# Patient Record
Sex: MALE | Race: WHITE | NOT HISPANIC OR LATINO | Employment: FULL TIME | URBAN - METROPOLITAN AREA
[De-identification: names, ages, dates, MRNs, and addresses within clinical notes are randomized per-mention and may not be internally consistent; named-entity substitution may affect disease eponyms.]

---

## 2018-04-13 ENCOUNTER — HISTORICAL (OUTPATIENT)
Dept: ADMINISTRATIVE | Facility: HOSPITAL | Age: 58
End: 2018-04-13

## 2018-04-13 LAB
ABS NEUT (OLG): 3.2
ALBUMIN SERPL-MCNC: 4.7 GM/DL (ref 3.4–5)
ALBUMIN/GLOB SERPL: 2.04 {RATIO} (ref 1.5–2.5)
ALP SERPL-CCNC: 26 UNIT/L (ref 38–126)
ALT SERPL-CCNC: 25 UNIT/L (ref 7–52)
AST SERPL-CCNC: 21 UNIT/L (ref 15–37)
BILIRUB SERPL-MCNC: 0.5 MG/DL (ref 0.2–1)
BILIRUBIN DIRECT+TOT PNL SERPL-MCNC: 0.1 MG/DL (ref 0–0.5)
BUN SERPL-MCNC: 15 MG/DL (ref 7–18)
CALCIUM SERPL-MCNC: 9.3 MG/DL (ref 8.5–10)
CHLORIDE SERPL-SCNC: 99 MMOL/L (ref 98–107)
CHOLEST SERPL-MCNC: 181 MG/DL (ref 0–200)
CHOLEST/HDLC SERPL: 3.2 {RATIO}
CO2 SERPL-SCNC: 26 MMOL/L (ref 21–32)
CREAT SERPL-MCNC: 1.08 MG/DL (ref 0.6–1.3)
CREAT/UREA NIT SERPL: 13.9
ERYTHROCYTE [DISTWIDTH] IN BLOOD BY AUTOMATED COUNT: 12.7 % (ref 11.5–17)
GGT SERPL-CCNC: 50 UNIT/L (ref 5–85)
GLOBULIN SER-MCNC: 2.3 GM/DL (ref 1.2–3)
GLUCOSE SERPL-MCNC: 106 MG/DL (ref 74–106)
HCT VFR BLD AUTO: 46.9 % (ref 42–52)
HDLC SERPL-MCNC: 56 MG/DL (ref 35–60)
HGB BLD-MCNC: 16.2 GM/DL (ref 14–18)
LDH SERPL-CCNC: 132 UNIT/L (ref 140–271)
LDLC SERPL CALC-MCNC: 104 MG/DL (ref 0–129)
LYMPHOCYTES # BLD AUTO: 1.8 X10(3)/MCL (ref 0.6–3.4)
LYMPHOCYTES NFR BLD AUTO: 33.5 % (ref 13–40)
MCH RBC QN AUTO: 33.5 PG (ref 27–31.2)
MCHC RBC AUTO-ENTMCNC: 34 GM/DL (ref 32–36)
MCV RBC AUTO: 97 FL (ref 80–94)
MONOCYTES # BLD AUTO: 0.3 X10(3)/MCL (ref 0–1.8)
MONOCYTES NFR BLD AUTO: 5.3 % (ref 0.1–24)
NEUTROPHILS NFR BLD AUTO: 61.2 % (ref 47–80)
PLATELET # BLD AUTO: 172 X10(3)/MCL (ref 130–400)
PMV BLD AUTO: 8.3 FL
POTASSIUM SERPL-SCNC: 4.5 MMOL/L (ref 3.5–5.1)
PROT SERPL-MCNC: 7 GM/DL (ref 6.4–8.2)
RBC # BLD AUTO: 4.84 X10(6)/MCL (ref 4.7–6.1)
SODIUM SERPL-SCNC: 134 MMOL/L (ref 136–145)
TRIGL SERPL-MCNC: 95 MG/DL (ref 30–150)
TSH SERPL-ACNC: 2.38 MIU/ML (ref 0.35–4.94)
VLDLC SERPL CALC-MCNC: 19 MG/DL
WBC # SPEC AUTO: 5.3 X10(3)/MCL (ref 4.5–11.5)

## 2019-04-24 ENCOUNTER — HISTORICAL (OUTPATIENT)
Dept: ADMINISTRATIVE | Facility: HOSPITAL | Age: 59
End: 2019-04-24

## 2019-04-24 LAB
ABS NEUT (OLG): 3.9 X10(3)/MCL (ref 2.1–9.2)
ALBUMIN SERPL-MCNC: 4.1 GM/DL (ref 3.4–5)
ALBUMIN/GLOB SERPL: 1.95 {RATIO} (ref 1.5–2.5)
ALP SERPL-CCNC: 52 UNIT/L (ref 38–126)
ALT SERPL-CCNC: 28 UNIT/L (ref 7–52)
APPEARANCE, UA: CLEAR
AST SERPL-CCNC: 19 UNIT/L (ref 15–37)
BACTERIA #/AREA URNS AUTO: NORMAL /HPF
BILIRUB SERPL-MCNC: 0.4 MG/DL (ref 0.2–1)
BILIRUB UR QL STRIP: NEGATIVE MG/DL
BILIRUBIN DIRECT+TOT PNL SERPL-MCNC: 0.1 MG/DL (ref 0–0.5)
BILIRUBIN DIRECT+TOT PNL SERPL-MCNC: 0.3 MG/DL
BUN SERPL-MCNC: 12 MG/DL (ref 7–18)
CALCIUM SERPL-MCNC: 8.8 MG/DL (ref 8.5–10)
CHLORIDE SERPL-SCNC: 99 MMOL/L (ref 98–107)
CHOLEST SERPL-MCNC: 152 MG/DL (ref 0–200)
CHOLEST/HDLC SERPL: 3.4 {RATIO}
CO2 SERPL-SCNC: 31 MMOL/L (ref 21–32)
COLOR UR: YELLOW
CREAT SERPL-MCNC: 0.92 MG/DL (ref 0.6–1.3)
ERYTHROCYTE [DISTWIDTH] IN BLOOD BY AUTOMATED COUNT: 12.9 % (ref 11.5–17)
GLOBULIN SER-MCNC: 2.1 GM/DL (ref 1.2–3)
GLUCOSE (UA): NEGATIVE MG/DL
GLUCOSE SERPL-MCNC: 124 MG/DL (ref 74–106)
HCT VFR BLD AUTO: 33.3 % (ref 42–52)
HDLC SERPL-MCNC: 45 MG/DL (ref 35–60)
HGB BLD-MCNC: 11.6 GM/DL (ref 14–18)
HGB UR QL STRIP: NEGATIVE UNIT/L
KETONES UR QL STRIP: NEGATIVE MG/DL
LDLC SERPL CALC-MCNC: 79 MG/DL (ref 0–129)
LEUKOCYTE ESTERASE UR QL STRIP: NEGATIVE UNIT/L
LYMPHOCYTES # BLD AUTO: 1.1 X10(3)/MCL (ref 0.6–3.4)
LYMPHOCYTES NFR BLD AUTO: 20.5 % (ref 13–40)
MCH RBC QN AUTO: 31.5 PG (ref 27–31.2)
MCHC RBC AUTO-ENTMCNC: 35 GM/DL (ref 32–36)
MCV RBC AUTO: 90 FL (ref 80–94)
MONOCYTES # BLD AUTO: 0.6 X10(3)/MCL (ref 0.1–1.3)
MONOCYTES NFR BLD AUTO: 10.9 % (ref 0.1–24)
NEUTROPHILS NFR BLD AUTO: 68.6 % (ref 47–80)
NITRITE UR QL STRIP.AUTO: NEGATIVE
PH UR STRIP: 7 [PH]
PLATELET # BLD AUTO: 213 X10(3)/MCL (ref 130–400)
PMV BLD AUTO: 7.8 FL (ref 9.4–12.4)
POTASSIUM SERPL-SCNC: 4.6 MMOL/L (ref 3.5–5.1)
PROT SERPL-MCNC: 6.2 GM/DL (ref 6.4–8.2)
PROT UR QL STRIP: NEGATIVE MG/DL
PSA SERPL-MCNC: 3.61 NG/ML (ref 0–3.5)
RBC # BLD AUTO: 3.68 X10(6)/MCL (ref 4.7–6.1)
RBC #/AREA URNS HPF: NORMAL /HPF
SODIUM SERPL-SCNC: 136 MMOL/L (ref 136–145)
SP GR UR STRIP: 1.02
SQUAMOUS EPITHELIAL, UA: NORMAL /LPF
T4 FREE SERPL-MCNC: 0.89 NG/DL (ref 0.76–1.46)
TESTOST SERPL-MCNC: 248 NG/DL (ref 300–1060)
TRIGL SERPL-MCNC: 128 MG/DL (ref 30–150)
TSH SERPL-ACNC: 1.54 MIU/ML (ref 0.35–4.94)
UROBILINOGEN UR STRIP-ACNC: 0.2 MG/DL
VLDLC SERPL CALC-MCNC: 25.6 MG/DL
WBC # SPEC AUTO: 5.6 X10(3)/MCL (ref 4.5–11.5)
WBC #/AREA URNS AUTO: NORMAL /[HPF]

## 2019-04-25 LAB
EST. AVERAGE GLUCOSE BLD GHB EST-MCNC: 105 MG/DL
HBA1C MFR BLD: 5.3 % (ref 4.4–6.4)

## 2019-04-29 ENCOUNTER — HISTORICAL (OUTPATIENT)
Dept: LAB | Facility: HOSPITAL | Age: 59
End: 2019-04-29

## 2019-04-29 LAB
FERRITIN SERPL-MCNC: 300.8 NG/ML (ref 8–388)
IRON SATN MFR SERPL: 18.7 % (ref 20–50)
IRON SERPL-MCNC: 45 MCG/DL (ref 50–175)
RET# (OHS): 0.09 X10^6/ML (ref 0.03–0.1)
RETICULOCYTE COUNT AUTOMATED (OLG): 2.4 % (ref 1.1–2.1)
TIBC SERPL-MCNC: 241 MCG/DL (ref 250–450)
TRANSFERRIN SERPL-MCNC: 189 MG/DL (ref 200–360)

## 2019-10-25 ENCOUNTER — HISTORICAL (OUTPATIENT)
Dept: ADMINISTRATIVE | Facility: HOSPITAL | Age: 59
End: 2019-10-25

## 2019-10-25 LAB
ABS NEUT (OLG): 2.43 X10(3)/MCL (ref 2.1–9.2)
BASOPHILS # BLD AUTO: 0 X10(3)/MCL (ref 0–0.2)
BASOPHILS NFR BLD AUTO: 1 %
EOSINOPHIL # BLD AUTO: 0.2 X10(3)/MCL (ref 0–0.9)
EOSINOPHIL NFR BLD AUTO: 4 %
ERYTHROCYTE [DISTWIDTH] IN BLOOD BY AUTOMATED COUNT: 12.7 % (ref 11.5–17)
FERRITIN SERPL-MCNC: 141 NG/ML (ref 8–388)
FOLATE SERPL-MCNC: 24.1 NG/ML (ref 3.1–17.5)
HAPTOGLOB SERPL-MCNC: 113 MG/DL (ref 31–200)
HCT VFR BLD AUTO: 40.5 % (ref 42–52)
HGB BLD-MCNC: 13.6 GM/DL (ref 14–18)
IRON SATN MFR SERPL: 22.3 % (ref 20–50)
IRON SERPL-MCNC: 68 MCG/DL (ref 50–175)
LDH SERPL-CCNC: 142 UNIT/L (ref 87–241)
LYMPHOCYTES # BLD AUTO: 1.4 X10(3)/MCL (ref 0.6–4.6)
LYMPHOCYTES NFR BLD AUTO: 31 %
MCH RBC QN AUTO: 32.3 PG (ref 27–31)
MCHC RBC AUTO-ENTMCNC: 33.6 GM/DL (ref 33–36)
MCV RBC AUTO: 96.2 FL (ref 80–94)
MONOCYTES # BLD AUTO: 0.4 X10(3)/MCL (ref 0.1–1.3)
MONOCYTES NFR BLD AUTO: 9 %
NEUTROPHILS # BLD AUTO: 2.43 X10(3)/MCL (ref 2.1–9.2)
NEUTROPHILS NFR BLD AUTO: 55 %
PLATELET # BLD AUTO: 161 X10(3)/MCL (ref 130–400)
PMV BLD AUTO: 9 FL (ref 9.4–12.4)
RBC # BLD AUTO: 4.21 X10(6)/MCL (ref 4.7–6.1)
RET# (OHS): 0.08 X10^6/ML (ref 0.03–0.1)
RETICULOCYTE COUNT AUTOMATED (OLG): 1.8 % (ref 1.1–2.1)
TIBC SERPL-MCNC: 305 MCG/DL (ref 250–450)
TRANSFERRIN SERPL-MCNC: 233 MG/DL (ref 200–360)
VIT B12 SERPL-MCNC: 491 PG/ML (ref 193–986)
WBC # SPEC AUTO: 4.4 X10(3)/MCL (ref 4.5–11.5)

## 2020-04-30 ENCOUNTER — HISTORICAL (OUTPATIENT)
Dept: LAB | Facility: HOSPITAL | Age: 60
End: 2020-04-30

## 2020-04-30 ENCOUNTER — HISTORICAL (OUTPATIENT)
Dept: ADMINISTRATIVE | Facility: HOSPITAL | Age: 60
End: 2020-04-30

## 2020-04-30 LAB
ABS NEUT (OLG): 2.8 X10(3)/MCL (ref 2.1–9.2)
ALBUMIN SERPL-MCNC: 4.4 GM/DL (ref 3.4–5)
ALBUMIN/GLOB SERPL: 1.8 RATIO (ref 1.1–2)
ALP SERPL-CCNC: 45 UNIT/L (ref 40–150)
ALT SERPL-CCNC: 15 UNIT/L (ref 0–55)
APPEARANCE, UA: CLEAR
AST SERPL-CCNC: 17 UNIT/L (ref 5–34)
BACTERIA #/AREA URNS AUTO: NORMAL /HPF
BILIRUB SERPL-MCNC: 0.5 MG/DL
BILIRUB UR QL STRIP: NEGATIVE MG/DL
BILIRUBIN DIRECT+TOT PNL SERPL-MCNC: 0.2 MG/DL (ref 0–0.5)
BILIRUBIN DIRECT+TOT PNL SERPL-MCNC: 0.3 MG/DL (ref 0–0.8)
BUN SERPL-MCNC: 18 MG/DL (ref 7–25)
CALCIUM SERPL-MCNC: 9.3 MG/DL (ref 8.5–10.1)
CHLORIDE SERPL-SCNC: 100 MMOL/L (ref 98–107)
CHOLEST SERPL-MCNC: 220 MG/DL
CHOLEST/HDLC SERPL: 4 {RATIO} (ref 0–5)
CO2 SERPL-SCNC: 28 MMOL/L (ref 23–31)
COLOR UR: YELLOW
CREAT SERPL-MCNC: 1.05 MG/DL (ref 0.73–1.18)
ERYTHROCYTE [DISTWIDTH] IN BLOOD BY AUTOMATED COUNT: 12.6 % (ref 11.5–17)
GLOBULIN SER-MCNC: 2.5 GM/DL (ref 2.4–3.5)
GLUCOSE (UA): NEGATIVE MG/DL
GLUCOSE SERPL-MCNC: 108 MG/DL (ref 82–115)
HCT VFR BLD AUTO: 41.5 % (ref 42–52)
HDLC SERPL-MCNC: 49 MG/DL (ref 35–60)
HGB BLD-MCNC: 13.9 GM/DL (ref 14–18)
HGB UR QL STRIP: NEGATIVE UNIT/L
KETONES UR QL STRIP: NEGATIVE MG/DL
LDLC SERPL CALC-MCNC: 141 MG/DL (ref 50–140)
LEUKOCYTE ESTERASE UR QL STRIP: NEGATIVE UNIT/L
LYMPHOCYTES # BLD AUTO: 1.3 X10(3)/MCL (ref 0.6–3.4)
LYMPHOCYTES NFR BLD AUTO: 29 % (ref 13–40)
MCH RBC QN AUTO: 31.2 PG (ref 27–31)
MCHC RBC AUTO-ENTMCNC: 34 GM/DL (ref 33–36)
MCV RBC AUTO: 93 FL (ref 80–94)
MONOCYTES # BLD AUTO: 0.4 X10(3)/MCL (ref 0.1–1.3)
MONOCYTES NFR BLD AUTO: 8.4 % (ref 0.1–24)
NEUTROPHILS NFR BLD AUTO: 62.6 % (ref 47–80)
NITRITE UR QL STRIP.AUTO: NEGATIVE
PH UR STRIP: 6.5 [PH]
PLATELET # BLD AUTO: 198 X10(3)/MCL (ref 130–400)
PMV BLD AUTO: 8.5 FL (ref 9.4–12.4)
POTASSIUM SERPL-SCNC: 4.5 MMOL/L (ref 3.5–5.1)
PROT SERPL-MCNC: 6.9 GM/DL (ref 5.8–7.6)
PROT UR QL STRIP: NEGATIVE MG/DL
PSA SERPL-MCNC: 3.34 NG/ML (ref 0–4.5)
RBC # BLD AUTO: 4.46 X10(6)/MCL (ref 4.7–6.1)
RBC #/AREA URNS HPF: NORMAL /[HPF]
SODIUM SERPL-SCNC: 135 MMOL/L (ref 136–145)
SP GR UR STRIP: 1.02
SQUAMOUS EPITHELIAL, UA: NORMAL /LPF
T4 FREE SERPL-MCNC: 0.87 NG/DL (ref 0.76–1.46)
TRIGL SERPL-MCNC: 149 MG/DL (ref 34–140)
TSH SERPL-ACNC: 3.05 MIU/ML (ref 0.35–4.94)
UROBILINOGEN UR STRIP-ACNC: 0.2 MG/DL
VLDLC SERPL CALC-MCNC: 30 MG/DL
WBC # SPEC AUTO: 4.5 X10(3)/MCL (ref 4.5–11.5)
WBC #/AREA URNS AUTO: NORMAL /[HPF]

## 2022-04-10 ENCOUNTER — HISTORICAL (OUTPATIENT)
Dept: ADMINISTRATIVE | Facility: HOSPITAL | Age: 62
End: 2022-04-10

## 2022-04-29 VITALS
WEIGHT: 195.56 LBS | BODY MASS INDEX: 26.49 KG/M2 | DIASTOLIC BLOOD PRESSURE: 70 MMHG | SYSTOLIC BLOOD PRESSURE: 110 MMHG | HEIGHT: 72 IN

## 2022-05-02 NOTE — HISTORICAL [facility]ERNER
This is a historical note converted from Wen. Formatting and pictures may have been removed.  Please reference Wen for original formatting and attached multimedia. Chief Complaint  ANNUAL WELLNESS PHYSICAL- NPO  History of Present Illness  Pt presents for Wellness exam.  He has been feeling pretty well.  He sleeps well.  His appetite is good.  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  He lives with his spouse and 2 children. He also has a stepdaughter.  He has alcohol 1-2 times a week.  He has 2 cups of coffee a day.  Tobacco:? He may dip once a week.  He exercises?1-2?times a week.  Colonoscopy 2014.  Review of Systems  Constitutional:?no?weight gain,?no?weight loss,?+?fatigue, ?no?fever, ?no?chills, ?no?weakness, ?no?trouble sleeping  Eyes: ?no?vision loss/changes,?+?glasses?and contacts,??no?pain,??no?redness,??no?blurry or double vision,??no?flashing lights,??no?glaucoma,??no?cataracts  Last eye exam:?about 1 year  Neck: ?no?lymphadenopathy,??no?thyroid abnormalities,??no?bruits,??no?stiffness  Ears:?no?decreased hearing,??+?tinnitus,??no?earache,??no?drainage?  Nose:?no?congestion,??no?rhinorrhea,??no?epistaxis,??no?sinus pressure  Throat/Oral:?no?sore throat,??no?hoarseness, ?no?dental caries,??no?gum bleeding,??no?oral lesions  Cardiovascular:?no?chest pain, palpitations, or tightness,?+?dyspnea with exertion,??no?orthopnea,??no?paroxysmal nocturnal dyspnea; he had some chest tightness last Friday while he was in [place]  Respiratory:?no?cough,?no?sputum,??no?hemoptysis,??no?dyspnea,??no?wheezing,??no?pleuritic chest pain?  Gastrointestinal:?no?abdominal pain,?no?nausea,?no?vomiting,??no?heartburn,??no?dysphagia or odynophagia,??no?diarrhea,??no?constipation,??no?melena,??+?hematochezia occasionally when he wipes,?no?jaundice  Urinary:?no?frequency,??no?urgency,??no?burning or pain,?no?hematuria,??no?incontinence,??no?hesitancy,??no?incomplete voiding,??no?flank  pain,??no?nocturia  Musculoskeletal:?no?myalgias,?+?arthralgias neck , he has some tightness behind his right shoulder, +?neck pain,??no?back pain,??no?swelling of extremities  Skin:?no?rashes,?no?sores,?no?non-healing wounds  Neurologic:?+?headaches over the last few months: dull, persistent, ?no?dizziness/lightheadedness,?no?tremors,?no?paresthesias,??no?seizures,??no?muscle weakness  Psychiatric:?no?depression/sadness,?no?anhedonia,?no?irritability,?no?suicidal ideations,?no?anxiety,?no?panic attacks  Endocrine:?no?hot or cold intolerance,?no?sweating,?no?polyuria,?no?polydipsia,?no?polyphagia  Hematologic:?no?bruising,??no?bleeding disorders?  Physical Exam  Vitals & Measurements  HR:?84(Peripheral)? BP:?118/60?  HT:?182?cm? WT:?91.0?kg? BMI:?27.47?  ?  GENERAL: The patient is a well-developed, well-nourished male in no?apparent distress. He is alert and oriented x 4.  HEENT: Head is normocephalic and atraumatic. Extraocular muscles are intact. Pupils are equal, round, and reactive to light and accommodation. Nares appeared normal. Mouth is well hydrated and without lesions. Mucous membranes are moist. Posterior pharynx clear of any exudate or lesions.  NECK: Supple. No carotid bruits.? No lymphadenopathy or thyromegaly.  LUNGS: Clear to auscultation.  HEART: Regular rate and rhythm without murmurs, rubs or gallops.  ABDOMEN: Soft, nontender, and nondistended.? Positive bowel sounds.? No hepatosplenomegaly was noted.  EXTREMITIES: Without any cyanosis, clubbing, rash, lesions or edema.  NEUROLOGIC: Cranial nerves II through XII are grossly intact.? No motor or sensory deficits.? Cerebellar function intact.  SKIN: No ulceration or induration present.  :? Normal male genitalia, no hernias,??NST,??prostate soft, smooth and without nodules.  ?  Assessment/Plan  1.?Wellness examination?Z00.00  ?Patient has been doing?well overall.? He does mention of a few issues. ?He does report some fatigue since discontinuing  his testosterone injections.? He still has some neck discomfort even with taking the meloxicam.  He has gotten the first dose of the Shingrix vaccine;?due for the second dose in May.  Ordered:  Automated Diff, Routine collect, 04/24/19 8:26:00 CDT, Blood, Collected, Stop date 04/24/19 8:26:00 CDT, Lab Collect, Wellness examination  HTN (hypertension)  Fatigue, 04/24/19 8:26:00 CDT  CBC w/ Auto Diff, Routine collect, 04/24/19 8:26:00 CDT, Blood, Stop date 04/24/19 8:26:00 CDT, Lab Collect, Wellness examination  HTN (hypertension)  Fatigue, 04/24/19 8:26:00 CDT  Clinic Follow Up Physical, *Est. 04/30/20 8:00:00 CDT, Order for future visit, Wellness examination, [facility]  Comprehensive Metabolic Panel, Routine collect, 04/24/19 8:26:00 CDT, Blood, Stop date 04/24/19 8:26:00 CDT, Lab Collect, Wellness examination  HTN (hypertension)  HLD (hyperlipidemia)  Fatigue, 04/24/19 8:26:00 CDT  Lab Collection Request, 04/24/19 7:56:00 CDT, [facility] AMB - AFP, 04/24/19 7:56:00 CDT  Lipid Panel, Routine collect, 04/24/19 8:26:00 CDT, Blood, Stop date 04/24/19 8:26:00 CDT, Lab Collect, Wellness examination  HLD (hyperlipidemia)  HTN (hypertension), 04/24/19 8:26:00 CDT  Preventative Health Care Est 40-64 years [id number] PC, Wellness examination  HTN (hypertension)  HLD (hyperlipidemia)  Obstructive sleep apnea  Insomnia  Depression  Degenerative disc disease  Hypogonadism  Erectile dysfunction  Fatigue, [facility] AMB - AFP, 04/24/19 7:56:00 CDT  Prostate Specific Antigen, Routine collect, 04/24/19 8:26:00 CDT, Blood, Stop date 04/24/19 8:26:00 CDT, Lab Collect, Wellness examination, 04/24/19 8:26:00 CDT  Urinalysis Complete no reflex, Routine collect, Urine, 04/24/19 8:26:00 CDT, Stop date 04/24/19 8:26:00 CDT, Nurse collect, Wellness examination  HTN (hypertension)  Fatigue  ?  2.?HTN (hypertension)?I10  ?Well-controlled; continue current medication.? Patient?has tried on?olmesartan and valsartan; he prefers?to  valsartan.  Ordered:  Automated Diff, Routine collect, 04/24/19 8:26:00 CDT, Blood, Collected, Stop date 04/24/19 8:26:00 CDT, Lab Collect, Wellness examination  HTN (hypertension)  Fatigue, 04/24/19 8:26:00 CDT  CBC w/ Auto Diff, Routine collect, 04/24/19 8:26:00 CDT, Blood, Stop date 04/24/19 8:26:00 CDT, Lab Collect, Wellness examination  HTN (hypertension)  Fatigue, 04/24/19 8:26:00 CDT  Comprehensive Metabolic Panel, Routine collect, 04/24/19 8:26:00 CDT, Blood, Stop date 04/24/19 8:26:00 CDT, Lab Collect, Wellness examination  HTN (hypertension)  HLD (hyperlipidemia)  Fatigue, 04/24/19 8:26:00 CDT  Lab Collection Request, 04/24/19 7:56:00 CDT, [facility] AMB - AFP, 04/24/19 7:56:00 CDT  Lipid Panel, Routine collect, 04/24/19 8:26:00 CDT, Blood, Stop date 04/24/19 8:26:00 CDT, Lab Collect, Wellness examination  HLD (hyperlipidemia)  HTN (hypertension), 04/24/19 8:26:00 CDT  Preventative Health Care Est 40-64 years [id number] PC, Wellness examination  HTN (hypertension)  HLD (hyperlipidemia)  Obstructive sleep apnea  Insomnia  Depression  Degenerative disc disease  Hypogonadism  Erectile dysfunction  Fatigue, [facility] AMB - AFP, 04/24/19 7:56:00 CDT  Urinalysis Complete no reflex, Routine collect, Urine, 04/24/19 8:26:00 CDT, Stop date 04/24/19 8:26:00 CDT, Nurse collect, Wellness examination  HTN (hypertension)  Fatigue  ?  3.?HLD (hyperlipidemia)?E78.5  ?Check today.  Ordered:  Comprehensive Metabolic Panel, Routine collect, 04/24/19 8:26:00 CDT, Blood, Stop date 04/24/19 8:26:00 CDT, Lab Collect, Wellness examination  HTN (hypertension)  HLD (hyperlipidemia)  Fatigue, 04/24/19 8:26:00 CDT  Lab Collection Request, 04/24/19 7:56:00 CDT, [facility] AMB - AFP, 04/24/19 7:56:00 CDT  Lipid Panel, Routine collect, 04/24/19 8:26:00 CDT, Blood, Stop date 04/24/19 8:26:00 CDT, Lab Collect, Wellness examination  HLD (hyperlipidemia)  HTN (hypertension), 04/24/19 8:26:00 CDT  Preventative Health Care Est 40-64  years [id number] PC, Wellness examination  HTN (hypertension)  HLD (hyperlipidemia)  Obstructive sleep apnea  Insomnia  Depression  Degenerative disc disease  Hypogonadism  Erectile dysfunction  Fatigue, [facility] AMB - AFP, 04/24/19 7:56:00 CDT  ?  4.?Obstructive sleep apnea?G47.33  He has been?compliant with and doing well on therapy.  Ordered:  [facility] Care Est 40-64 years [id number] PC, Wellness examination  HTN (hypertension)  HLD (hyperlipidemia)  Obstructive sleep apnea  Insomnia  Depression  Degenerative disc disease  Hypogonadism  Erectile dysfunction  Fatigue, [facility] AMB - AFP, 04/24/19 7:56:00 CDT  ?  5.?Insomnia?G47.00  Stable on trazodone.  Ordered:  [facility] Care Est 40-64 years [id number] PC, Wellness examination  HTN (hypertension)  HLD (hyperlipidemia)  Obstructive sleep apnea  Insomnia  Depression  Degenerative disc disease  Hypogonadism  Erectile dysfunction  Fatigue, [facility] AMB - AFP, 04/24/19 7:56:00 CDT  ?  6.?Depression?F32.9  ?Stable on Wellbutrin.  Ordered:  [facility] Care Est 40-64 years [id number] PC, Wellness examination  HTN (hypertension)  HLD (hyperlipidemia)  Obstructive sleep apnea  Insomnia  Depression  Degenerative disc disease  Hypogonadism  Erectile dysfunction  Fatigue, [facility] AMB - AFP, 04/24/19 7:56:00 CDT  ?  7.?Degenerative disc disease?M51.9  ?He is still symptomatic; we will increase his meloxicam to 15 mg daily. ?He is to call us in 1 month with an update.  Ordered:  [facility] Care Est 40-64 years [id number] PC, Wellness examination  HTN (hypertension)  HLD (hyperlipidemia)  Obstructive sleep apnea  Insomnia  Depression  Degenerative disc disease  Hypogonadism  Erectile dysfunction  Fatigue, [facility] AMB - AFP, 04/24/19 7:56:00 CDT  ?  8.?Hypogonadism?E23.7  We will check his levels?as he has had decreased vitality energy since discontinuing his injections.  Ordered:  Lab Collection Request, 04/24/19 7:56:00 CDT, [facility]  AMB - AFP, 04/24/19 7:56:00 CDT  Preventative Health Care Est 40-64 years [id number] PC, Wellness examination  HTN (hypertension)  HLD (hyperlipidemia)  Obstructive sleep apnea  Insomnia  Depression  Degenerative disc disease  Hypogonadism  Erectile dysfunction  Fatigue, [facility] AMB - AFP, 04/24/19 7:56:00 CDT  Testosterone Level Total, Routine collect, 04/24/19 8:26:00 CDT, Blood, Stop date 04/24/19 8:26:00 CDT, Lab Collect, Hypogonadism  Erectile dysfunction  Fatigue, 04/24/19 8:26:00 CDT  ?  9.?Erectile dysfunction?N52.9  We gave him a?prescription refill for generic tadalafil?20 mg.  Ordered:  Preventative Health Care Est 40-64 years [id number] PC, Wellness examination  HTN (hypertension)  HLD (hyperlipidemia)  Obstructive sleep apnea  Insomnia  Depression  Degenerative disc disease  Hypogonadism  Erectile dysfunction  Fatigue, [facility] AMB - AFP, 04/24/19 7:56:00 CDT  Testosterone Level Total, Routine collect, 04/24/19 8:26:00 CDT, Blood, Stop date 04/24/19 8:26:00 CDT, Lab Collect, Hypogonadism  Erectile dysfunction  Fatigue, 04/24/19 8:26:00 CDT  ?  10.?Fatigue?R53.83  ?We will check patients lab, including his testosterone level.  He?was advised to be more physically active and to increase his exercise.  Ordered:  Automated Diff, Routine collect, 04/24/19 8:26:00 CDT, Blood, Collected, Stop date 04/24/19 8:26:00 CDT, Lab Collect, Wellness examination  HTN (hypertension)  Fatigue, 04/24/19 8:26:00 CDT  CBC w/ Auto Diff, Routine collect, 04/24/19 8:26:00 CDT, Blood, Stop date 04/24/19 8:26:00 CDT, Lab Collect, Wellness examination  HTN (hypertension)  Fatigue, 04/24/19 8:26:00 CDT  Comprehensive Metabolic Panel, Routine collect, 04/24/19 8:26:00 CDT, Blood, Stop date 04/24/19 8:26:00 CDT, Lab Collect, Wellness examination  HTN (hypertension)  HLD (hyperlipidemia)  Fatigue, 04/24/19 8:26:00 CDT  Free T4, Routine collect, 04/24/19 8:26:00 CDT, Blood, Stop date 04/24/19 8:26:00 CDT, Lab Collect,  Fatigue, 04/24/19 8:26:00 CDT  Lab Collection Request, 04/24/19 7:56:00 CDT, [facility] AMB - AFP, 04/24/19 7:56:00 CDT  [facility] Care Est 40-64 years [id number] PC, Wellness examination  HTN (hypertension)  HLD (hyperlipidemia)  Obstructive sleep apnea  Insomnia  Depression  Degenerative disc disease  Hypogonadism  Erectile dysfunction  Fatigue, [facility] AMB - AFP, 04/24/19 7:56:00 CDT  Testosterone Level Total, Routine collect, 04/24/19 8:26:00 CDT, Blood, Stop date 04/24/19 8:26:00 CDT, Lab Collect, Hypogonadism  Erectile dysfunction  Fatigue, 04/24/19 8:26:00 CDT  Thyroid Stimulating Hormone, Routine collect, 04/24/19 8:26:00 CDT, Blood, Stop date 04/24/19 8:26:00 CDT, Lab Collect, Fatigue, 04/24/19 8:26:00 CDT  Urinalysis Complete no reflex, Routine collect, Urine, 04/24/19 8:26:00 CDT, Stop date 04/24/19 8:26:00 CDT, Nurse collect, Wellness examination  HTN (hypertension)  Fatigue  ?  Orders:  atorvastatin, See Instructions, TAKE 1 TABLET DAILY, # 90 tab(s), 3 Refill(s), Pharmacy: [id number]  buPROPion, 300 mg = 1 tab(s), Oral, Daily, # 90 tab(s), 3 Refill(s), Pharmacy: [facility] [id number]  meloxicam, 15 mg = 1 tab(s), Oral, Daily, # 90 tab(s), 2 Refill(s), Pharmacy: [facility] [id number]  tadalafil, 20 mg = 1 tab(s), Oral, Daily, # 5 tab(s), 5 Refill(s), Pharmacy: [facility] [id number]  traZODone, See Instructions, TAKE 1 TABLET BY MOUTH EVERY NIGHT AT BEDTIME, # 90 tab(s), 3 Refill(s), Pharmacy: [facility] [id number]  valsartan, 80 mg = 1 tab(s), Oral, Daily, # 90 tab(s), 3 Refill(s), Pharmacy: [facility] [id number]   Problem List/Past Medical History  Ongoing  Degenerative disc disease  Depression  Erectile dysfunction  Fatigue  HLD (hyperlipidemia)  HTN (hypertension)  Hypogonadism  Insomnia  Obstructive sleep apnea  Historical  Acute sinusitis  Hypertension  Procedure/Surgical History  Colonoscopy (2014)   Medications  atorvastatin 20 mg oral tablet,  See Instructions, 3 refills  buPROPion 300 mg/24 hours (XL) oral tablet, extended release, 300 mg= 1 tab(s), Oral, Daily, 3 refills  Flexeril 5 mg oral tablet, 5 mg= 1 tab(s), Oral, Once a day (at bedtime), 1 refills  meloxicam 15 mg oral tablet, 15 mg= 1 tab(s), Oral, Daily, 2 refills  tadalafil 20 mg oral tablet, 20 mg= 1 tab(s), Oral, Daily, 5 refills  traZODone 100 mg oral tablet, See Instructions, 3 refills  valsartan 80 mg oral tablet, 80 mg= 1 tab(s), Oral, Daily, 3 refills  Allergies  No Known Allergies  Social History  Alcohol  Current, 1-2 times per week, 04/13/2018  Employment/School  Employed, Work/School description:  FOR [employer] 04/24/2019  Exercise  Exercise duration: 30. Exercise frequency: 1-2 times/week. Exercise type: Aerobics, Running, STRENGTH TRAINING., 04/24/2019  Home/Environment  Lives with Children, Spouse. Living situation: Home/Independent. Home equipment: CPAP/BiPAP., 04/24/2019  Nutrition/Health  Regular, Caffeine intake amount: 2 CUPS OF COFFEE PER DAY., 04/13/2018  Substance Abuse  Never, 04/13/2018  Tobacco  Former smoker, quit more than 30 days ago, N/A, 04/24/2019  Former smoker, Cigarettes, Oral, 1 per day., 04/13/2018  Family History  Alcoholism: Father.  Cancer: Mother.  Cirrhosis of liver: Father.  Diabetes mellitus: Father.  Hyperlipidemia 27-APR-2016 23:35:49<$>: Mother.  Hypertension: Father.  Prostate cancer: Father.  Thyroid disease: Mother.  Immunizations  Vaccine Date Status   influenza virus vaccine, inactivated 09/02/2016 Recorded   pneumococcal 13-valent conjugate vaccine 03/27/2015 Recorded   tetanus/diphtheria/pertussis, acel(Tdap) 03/27/2015 Recorded   Health Maintenance  Health Maintenance  ???Pending?(in the next year)  ??? ??OverDue  ??? ? ? ?Diabetes Screening due??and every?  ??? ? ? ?Hypertension Management-BMP due??04/13/19??and every 1??year(s)  ??? ??Due?  ??? ? ? ?ADL Screening due??04/24/19??and every 1??year(s)  ??? ?  ? ?Alcohol Misuse Screening due??04/24/19??and every 1??year(s)  ??? ? ? ?Aspirin Therapy for CVD Prevention due??04/24/19??and every 1??year(s)  ??? ??Due In Future?  ??? ? ? ?Blood Pressure Screening not due until??04/23/20??and every 1??year(s)  ??? ? ? ?Body Mass Index Check not due until??04/23/20??and every 1??year(s)  ??? ? ? ?Hypertension Management-Blood Pressure not due until??04/23/20??and every 1??year(s)  ???Satisfied?(in the past 1 year)  ??? ??Satisfied?  ??? ? ? ?Blood Pressure Screening on??04/24/19.??Satisfied by Talisha Portillo LPN  ??? ? ? ?Body Mass Index Check on??04/24/19.??Satisfied by Talisha Portillo LPN  ??? ? ? ?Hypertension Management-Blood Pressure on??04/24/19.??Satisfied by Talisha Portillo LPN  ??? ? ? ?Obesity Screening on??04/24/19.??Satisfied by Talisha Portillo LPN  ?  ?  Diagnostic Results  EKG: Normal sinus rhythm, within normal limits.

## 2022-05-02 NOTE — HISTORICAL [facility] CERNER
This is a historical note converted from Wen. Formatting and pictures may have been removed.  Please reference Wen for original formatting and attached multimedia. Chief Complaint  ANNUAL WELLNESS PHYSICAL- NPO  History of Present Illness  See ROS.  Review of Systems  Constitutional:?no?weight gain,?no?weight loss,?+?fatigue, ?no?fever, ?no?chills, ?no?weakness, ?no?trouble sleeping  Eyes: ?no?vision loss/changes,?+?glasses?and contacts,??no?pain,??no?redness,??no?blurry or double vision,??no?flashing lights,??no?glaucoma,??no?cataracts  Last eye exam:??checked last August/September  Neck: ?no?lymphadenopathy,??no?thyroid abnormalities,??no?bruits,??no?stiffness  Ears:?+?decreased hearing,??no?tinnitus,??no?earache,??no?drainage?  Nose:?no?congestion,??no?rhinorrhea,??no?epistaxis,??no?sinus pressure  Throat/Oral:?no?sore throat,??no?hoarseness, ?no?dental caries,??no?gum bleeding,??no?oral lesions  Cardiovascular:?no?chest pain, palpations, or tightness,?no?dyspnea with exertion,??no?orthopnea,??no?paroxysmal nocturnal dyspnea  Respiratory:?no?cough,?no?sputum,??no?hemoptysis,??no?dyspnea,??no?wheezing,??no?pleuritic chest pain?  Gastrointestinal:?no?abd pain,?no?nausea,?no?vomiting,??no?heartburn,??no?dysphagia or odynophagia,??no?diarrhea,??no?constipation,??no?melena,??+?hematochezia when he wipes, he has hemorrhoids,?no?jaundice  Urinary:?no?frequency,??no?urgency,??no?burning or pain,?no?hematuria,??no?incontinence,??no?hesitancy,??no?incomplete voiding,??no?flank pain,??no?nocturia; sees urologist in [place]  Musculoskeletal:?no?myalgias,?no?arthralgias,?+?neck pain; has burning discomfort?without radiation to arms, takes otc analgesics 3-4 x wk,??no?back pain,??no?swelling of extremities  Skin:?no?rashes,?+?sores left forearm; has been cryod but did not go away,?no?non-healing wounds  Neurologic:?no?headaches,?no?dizziness/lightheadedness,?no?tremors,?no?paresthesias,??no?seizures,??no?muscle  weakness  Psychiatric:?no?depression/sadness,?no?anhedonia,?no?irritability,?no?suicidal ideations,?no?anxiety,?no?panic attacks  Endocrine:?no?hot or cold intolerance,?no?sweating,?no?polyuria,?no?polydipsia,?no?polyphagia  Hematologic:?no?bruising,??no?bleeding disorders?  ?  [employer]; computer work  Has a few drinks 1-2x wk  + Exercise 3-4 x wk  Wife reports he snores and quits breathing in his sleep. Wakes up tired at times. Reports daytime sleepiness/hypersomnolence. No headache in am.  Appetite too good  Physical Exam  Vitals & Measurements  HR:?84(Peripheral)? BP:?100/70?  HT:?183?cm? WT:?88.18?kg?  ?  GENERAL: The patient is a well-developed, well-nourished male in no?apparent distress. He is alert and oriented x 4.  HEENT: Head is normocephalic and atraumatic. Extraocular muscles are intact. Pupils are equal, round, and reactive to light and accommodation. Nares appeared normal. Mouth is well hydrated and without lesions. Mucous membranes are moist. Posterior pharynx clear of any exudate or lesions. Mallampati grade II.  NECK: Supple. No carotid bruits.? No lymphadenopathy or thyromegaly.  LUNGS: Clear to auscultation.  HEART: Regular rate and rhythm without murmur.  ABDOMEN: Soft, nontender, and nondistended.? Positive bowel sounds.? No hepatosplenomegaly was noted.  EXTREMITIES: Without any cyanosis, clubbing, rash, lesions or edema.  NEUROLOGIC: Cranial nerves II through XII are grossly intact.? No motor or sensory deficits.? Cerebellar function intact.  SKIN: No ulceration or induration present.? Left forearm at elbow is a dry keratinized slightly raised lesion, appears to be inflamed.  ?  ?  Assessment/Plan  1.?Wellness examination  Ordered:  Automated Diff, Routine collect, 04/13/18 10:37:00 CDT, Blood, Collected, Stop date 04/13/18 10:37:00 CDT, Lab Collect, Wellness examination, 04/13/18 10:37:00 CDT  CBC w/ Auto Diff, Routine collect, 04/13/18 10:37:00 CDT, Blood, Stop date  04/13/18 10:37:00 CDT, Lab Collect, Wellness examination, 04/13/18 10:37:00 CDT  Clinic Follow up, *Est. 04/13/19 3:00:00 CDT, Order for future visit, Wellness examination, [facility] AFP  CMP, Routine collect, 04/13/18 10:37:00 CDT, Blood, Stop date 04/13/18 10:37:00 CDT, Lab Collect, Wellness examination  HTN (hypertension), 04/13/18 10:37:00 CDT  Lipid Panel, Routine collect, 04/13/18 10:37:00 CDT, Blood, Stop date 04/13/18 10:37:00 CDT, Lab Collect, Wellness examination, 04/13/18 10:37:00 CDT  Preventative Health Care Est 40-64 years [address] PC, Wellness examination, [facility] AMB - AFP, 04/13/18 10:11:00 CDT  Thyroid Stimulating Hormone, Routine collect, 04/13/18 10:37:00 CDT, Blood, Stop date 04/13/18 10:37:00 CDT, Lab Collect, Wellness examination, 04/13/18 10:37:00 CDT  ?  2.?HTN (hypertension)  ?Well controlled. Continue current meds.  Ordered:  CMP, Routine collect, 04/13/18 10:37:00 CDT, Blood, Stop date 04/13/18 10:37:00 CDT, Lab Collect, Wellness examination  HTN (hypertension), 04/13/18 10:37:00 CDT  ?  3.?HLD (hyperlipidemia)  ?Check today.  ?  4.?Hypogonadism  ?Sees urologist in [place].  ?  5.?Depression  ?Stable on meds.  ?  6.?Insomnia  Is better on meds but pt is still having issues. Due to his reported non-restorative sleep, daytime hypersomnolence and reported snoring and apneic events, it would be medically prudent to do a home sleep evaluation.  ?  7.?Erectile dysfunction  ?Has Cialis per urologist.  ?  8.?Degenerative disc disease  ?Has a history of significant ddd and spinal stenosis. Has seen Dr Lam last year. Will try pt on Meloxicam and Flexeril and see how he does.  ?  9.?Skin lesion  ?Schedule for removal.  ?  Sleep apnea  Ordered:  Schedule Diagnostics Study, home sleep study, next available, 04/13/18 10:37:00 CDT  ?  Orders:  atorvastatin, 20 mg = 1 tab(s), Oral, Daily, # 30 tab(s), 11 Refill(s), Pharmacy: [facility]/pharmacy #[id number]  buPROPion, 300 mg = 1 tab(s), Oral, Daily, # 30 tab(s), 11  Refill(s), Pharmacy: [facility]/pharmacy #[id number]  cyclobenzaprine, 5 mg = 1 tab(s), Oral, Once a day (at bedtime), # 20 tab(s), 1 Refill(s), Pharmacy: [facility]/pharmacy #[id number]  meloxicam, 7.5 mg = 1 tab(s), Oral, Daily, # 30 tab(s), 2 Refill(s), [id number]  traZODone, 100 mg = 1 tab(s), Oral, Once a day (at bedtime), # 30 tab(s), 5 Refill(s), Pharmacy: [facility]/pharmacy #[id number]  valsartan, 80 mg = 1 tab(s), Oral, Daily, # 30 tab(s), 11 Refill(s), Pharmacy: [facility]/pharmacy #[id number]   Problem List/Past Medical History  Ongoing  Degenerative disc disease  Depression  Erectile dysfunction  HLD (hyperlipidemia)  HTN (hypertension)  Hypogonadism  Insomnia  Skin lesion  Wellness examination  Historical  Acute sinusitis  Hypertension  Procedure/Surgical History  Colonoscopy (2014).  Medications  atorvastatin 20 mg oral tablet, 20 mg= 1 tab(s), Oral, Daily, 11 refills  buPROPion 300 mg/24 hours (XL) oral tablet, extended release, 300 mg= 1 tab(s), Oral, Daily, 11 refills  CIALIS TAB 20MG, 20 mg= 1 tab(s), Oral, Daily  Flexeril 5 mg oral tablet, 5 mg= 1 tab(s), Oral, Once a day (at bedtime), 1 refills  meloxicam 7.5 mg oral tablet, 7.5 mg= 1 tab(s), Oral, Daily, 2 refills  TESTOST CYP INJ 200MG/ML, 100 mg, q7day  traZODone 100 mg oral tablet, 100 mg= 1 tab(s), Oral, Once a day (at bedtime), 5 refills  valsartan 80 mg oral tablet, 80 mg= 1 tab(s), Oral, Daily, 11 refills  Allergies  No Known Allergies  Social History  Alcohol  Current, 1-2 times per week, 04/13/2018  Employment/School  Employed, 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 Exercise frequency: 3-4 times/week. Exercise type: Aerobics, Running, STRENGTH TRAINING., 04/13/2018  Home/Environment  Lives with Spouse. Living situation: Home/Independent., 04/13/2018  Nutrition/Health  Regular, Caffeine intake amount: 2 CUPS OF COFFEE PER DAY., 04/13/2018  Substance Abuse  Never,  04/13/2018  Tobacco  Former smoker, Cigarettes, Oral, 1 per day., 04/13/2018  Family History  Alcoholism: Father.  Cancer: Mother.  Cirrhosis of liver: Father.  Diabetes mellitus: Father.  Hyperlipidemia 27-APR-2016 23:35:49<$>: Mother.  Hypertension: Father.  Prostate cancer: Father.  Thyroid disease: Mother.  Immunizations  Vaccine Date Status   influenza virus vaccine, inactivated 09/02/2016 Recorded   pneumococcal 13-valent conjugate vaccine 03/27/2015 Recorded   tetanus/diphtheria/pertussis, acel(Tdap) 03/27/2015 Recorded

## 2022-05-02 NOTE — HISTORICAL [facility] CERNER
This is a historical note converted from Wen. Formatting and pictures may have been removed.  Please reference Wen for original formatting and attached multimedia. Chief Complaint  Annual wellness physical- NPO  History of Present Illness  Pt presents for Wellness exam.  He has been feeling pretty well.  He sleeps well.  His appetite is good.  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  He lives with his spouse and 2 children. He also has 2 grown children and a stepdaughter.  He has alcohol 1-2 times a week.  He has 2 cups of coffee a day.  Tobacco:? He may dip once a week.  He exercises?4-5?times a week.  Colonoscopy 2019 Dr Beauchamp: mild diverticulosis, polyp x 1. EGD 2019 Dr Beauchamp: ?gastritis, duodenitis.  He had Shingrix vaccines x 2 at [facility].  Review of Systems  Constitutional:??no?weight gain,??no?weight loss,??+?fatigue, ?no?fever, ?no?chills, ?no?weakness, ?no?trouble sleeping, + ANA  Eyes: ?no?vision loss/changes,??+?glasses?and contacts,??no?pain,??no?redness,??no?blurry or double vision,??no?flashing lights,??no?glaucoma,??no?cataracts  Last eye exam:??February 2020  Neck: ?no?lymphadenopathy,??no?thyroid abnormalities,??no?bruits,??no?stiffness  Ears:??+?decreased hearing,??no?tinnitus,??no?earache,??no?drainage?  Nose:??no?congestion,??no?rhinorrhea,??no?epistaxis,??no?sinus pressure  Throat/Oral:??no?sore throat,??no?hoarseness, ?no?dental caries,??no?gum bleeding,??no?oral lesions  Cardiovascular:??no?chest pain, palpitations, or tightness,??no?dyspnea with exertion,??no?orthopnea,??no?paroxysmal nocturnal dyspnea, + hypertension, + hyperlipidemia  Respiratory:??no?cough,??no?sputum,??no?hemoptysis,??no?dyspnea,??no?wheezing,??no?pleuritic chest pain?  Gastrointestinal:??no?abdominal pain,??no?nausea,??no?vomiting,??no?heartburn,??no?dysphagia or odynophagia,??no?diarrhea,??no?constipation,??no?melena,??no?hematochezia,?no?jaundice  Urinary:??no?frequency,??+?urgency,??no?burning or  pain,?no?hematuria,??no?incontinence,??+?hesitancy,??no?incomplete voiding,??no?flank pain,?+ dribbling, ?+?nocturia x 2,? decreased stream  Musculoskeletal:?no?myalgias,??+?arthralgias: base of thumbs,?+?neck pain: DDD,?takes Meloxicam and Flexeril, ?no?back pain,??no?swelling of extremities, + left foot metatarsalgia  Skin:?no?rashes,??no?sores,??no?non-healing wounds  Neurologic:??no?headaches,??no?dizziness/lightheadedness,??no?tremors,??no?paresthesias,??no?seizures,??no?muscle weakness  Psychiatric:??no?depression/sadness,??no?anhedonia,??no?irritability,??no?suicidal ideations,??no?anxiety,??no?panic attacks  Endocrine:??no?hot or cold intolerance,??no?sweating,??no?polyuria,??no?polydipsia,??no?polyphagia, + hypogonadism: has been of injections at least a year  Hematologic:??no?bruising,??no?bleeding disorders, + iron deficiency anemia  Physical Exam  Vitals & Measurements  T:?36.9? ?C (Oral)? HR:?72(Peripheral)? BP:?110/70?  HT:?182?cm? WT:?88.7?kg? BMI:?26.78?  ?  GENERAL: The patient is a well-developed, well-nourished white?male in no?apparent distress. He is alert and oriented x 4.  HEENT: Head is normocephalic and atraumatic. Extraocular muscles are intact. Pupils are equal, round, and reactive to light and accommodation. Nares appeared normal. Mouth is well hydrated and without lesions. Mucous membranes are moist. Posterior pharynx clear of any exudate or lesions.  NECK: Supple. No carotid bruits.? No lymphadenopathy or thyromegaly. ?He has good?flexion; however, he has decreased extension?and bilateral rotation.  LUNGS: Clear to auscultation.  HEART: Regular rate and rhythm without murmur, gallops or rubs.  ABDOMEN: Soft, nontender, and nondistended.? Positive bowel sounds.? No hepatosplenomegaly was noted.  EXTREMITIES: Without any cyanosis, clubbing, rash, lesions or edema. ?Tender at base of thumb bilaterally.  NEUROLOGIC: Cranial nerves II through XII are grossly intact.? No motor or sensory  deficits.? Cerebellar function intact.  SKIN: No ulceration or induration present.  :? Normal male genitalia, no hernias, testes descended with normal size consistency.??NST,??prostate soft, smooth, enlarged?and without nodules.  ?  Assessment/Plan  1.?Wellness examination?Z00.00  Patient has been doing well.  He states his neck is still bothering him.? He does not take?meloxicam regularly.  He also reports discomfort at the base of his thumbs which is been worsening over the past 6 months.  He has been running and walking a lot.? He has developed metatarsalgia in his right foot. ?He has decreased his running but still walks regularly.? Patient assures me he wears well-padded shoes.  Patient had a complete GI work-up last year for anemia; results in chart.? Patient is on iron.  Patient has symptoms of benign?prostatic?hyperplasia; he does not desire?medications at this time.  He is up-to-date with his vaccinations.  Labs pending.  Ordered:  CBC w/ Auto Diff, Routine collect, 04/30/20 8:36:00 CDT, Blood, Stop date 04/30/20 8:36:00 CDT, Lab Collect, Wellness examination  HTN (hypertension)  Anemia  Fatigue, 04/30/20 8:36:00 CDT  Clinic Follow-Up Wellness, *Est. 04/30/21 3:00:00 CDT, Order for future visit, Wellness examination, HLink AFP  Comprehensive Metabolic Panel, Routine collect, 04/30/20 8:28:00 CDT, Blood, Order for future visit, Stop date 04/30/20 8:28:00 CDT, Lab Collect, Wellness examination  HTN (hypertension)  Fatigue, 04/30/20 8:28:00 CDT  Lipid Panel, Routine collect, 04/30/20 8:28:00 CDT, Blood, Order for future visit, Stop date 04/30/20 8:28:00 CDT, Lab Collect, Wellness examination  HLD (hyperlipidemia)  HTN (hypertension), 04/30/20 8:28:00 CDT  Preventative Health Care Est 40-64 years [id number] PC, Wellness examination  HTN (hypertension)  HLD (hyperlipidemia)  Hypogonadism  Erectile dysfunction  Obstructive sleep apnea  Degenerative disc disease  Anemia  Fatigue  Benign prostate  hyperplasia, HLINK AMB - AFP, 04/30/20 8:29:00 CDT  Prostate Specific Antigen, Routine collect, 04/30/20 8:36:00 CDT, Blood, Stop date 04/30/20 8:36:00 CDT, Lab Collect, Wellness examination  Benign prostate hyperplasia, 04/30/20 8:36:00 CDT  Urinalysis without Reflex, Routine collect, Urine, 04/30/20 8:36:00 CDT, Stop date 04/30/20 8:36:00 CDT, Nurse collect, Wellness examination  HTN (hypertension)  Fatigue  ?  2.?HTN (hypertension)?I10  ?Well-controlled; continue current medication.  Ordered:  CBC w/ Auto Diff, Routine collect, 04/30/20 8:36:00 CDT, Blood, Stop date 04/30/20 8:36:00 CDT, Lab Collect, Wellness examination  HTN (hypertension)  Anemia  Fatigue, 04/30/20 8:36:00 CDT  Comprehensive Metabolic Panel, Routine collect, 04/30/20 8:28:00 CDT, Blood, Order for future visit, Stop date 04/30/20 8:28:00 CDT, Lab Collect, Wellness examination  HTN (hypertension)  Fatigue, 04/30/20 8:28:00 CDT  Lipid Panel, Routine collect, 04/30/20 8:28:00 CDT, Blood, Order for future visit, Stop date 04/30/20 8:28:00 CDT, Lab Collect, Wellness examination  HLD (hyperlipidemia)  HTN (hypertension), 04/30/20 8:28:00 CDT  Preventative Health Care Est 40-64 years [id number] PC, Wellness examination  HTN (hypertension)  HLD (hyperlipidemia)  Hypogonadism  Erectile dysfunction  Obstructive sleep apnea  Degenerative disc disease  Anemia  Fatigue  Benign prostate hyperplasia, HLINK AMB - AFP, 04/30/20 8:29:00 CDT  Urinalysis without Reflex, Routine collect, Urine, 04/30/20 8:36:00 CDT, Stop date 04/30/20 8:36:00 CDT, Nurse collect, Wellness examination  HTN (hypertension)  Fatigue  ?  3.?HLD (hyperlipidemia)?E78.5  Compliant with medication; check lipid panel today.  Ordered:  Lipid Panel, Routine collect, 04/30/20 8:28:00 CDT, Blood, Order for future visit, Stop date 04/30/20 8:28:00 CDT, Lab Collect, Wellness examination  HLD (hyperlipidemia)  HTN (hypertension), 04/30/20 8:28:00 CDT  Preventative Health Care Est  40-64 years [id number] PC, Wellness examination  HTN (hypertension)  HLD (hyperlipidemia)  Hypogonadism  Erectile dysfunction  Obstructive sleep apnea  Degenerative disc disease  Anemia  Fatigue  Benign prostate hyperplasia, HLINK AMB - AFP, 04/30/20 8:29:00 CDT  ?  4.?Hypogonadism?E23.7  ?He has been off injections for at least 1 year; check labs today  Ordered:  [facility] Care Est 40-64 years [id number] PC, Wellness examination  HTN (hypertension)  HLD (hyperlipidemia)  Hypogonadism  Erectile dysfunction  Obstructive sleep apnea  Degenerative disc disease  Anemia  Fatigue  Benign prostate hyperplasia, HLINK AMB - AFP, 04/30/20 8:29:00 CDT  Testosterone Free, Adult Male-[facility], Routine collect, 04/30/20 8:28:00 CDT, Blood, Order for future visit, Stop date 04/30/20 8:28:00 CDT, Lab Collect, Fatigue  Hypogonadism, 04/30/20 8:28:00 CDT  ?  5.?Erectile dysfunction?N52.9  ?He states this is not been an issue recently; he has not been taking tadalafil.  Ordered:  [facility] Care Est 40-64 years [id number] , Wellness examination  HTN (hypertension)  HLD (hyperlipidemia)  Hypogonadism  Erectile dysfunction  Obstructive sleep apnea  Degenerative disc disease  Anemia  Fatigue  Benign prostate hyperplasia, HLINK AMB - AFP, 04/30/20 8:29:00 CDT  ?  6.?Obstructive sleep apnea?G47.33  ?Patient is compliant with and doing well on CPAP. ?He would benefit from continued usage.  Ordered:  [facility] Care Est 40-64 years [id number] PC, Wellness examination  HTN (hypertension)  HLD (hyperlipidemia)  Hypogonadism  Erectile dysfunction  Obstructive sleep apnea  Degenerative disc disease  Anemia  Fatigue  Benign prostate hyperplasia, HLINK AMB - AFP, 04/30/20 8:29:00 CDT  ?  7.?Degenerative disc disease?M51.9  ?Patient continues to have?neck discomfort.  I advised patient to try liquid Qunol let me know this works for him.  Ordered:  [facility] Care Est 40-64 years [id number] PC, Wellness  examination  HTN (hypertension)  HLD (hyperlipidemia)  Hypogonadism  Erectile dysfunction  Obstructive sleep apnea  Degenerative disc disease  Anemia  Fatigue  Benign prostate hyperplasia, HLINK AMB - AFP, 04/30/20 8:29:00 CDT  ?  8.?Anemia?D64.9  ?Will check CBC and ferritin levels today.  Ordered:  CBC w/ Auto Diff, Routine collect, 04/30/20 8:36:00 CDT, Blood, Stop date 04/30/20 8:36:00 CDT, Lab Collect, Wellness examination  HTN (hypertension)  Anemia  Fatigue, 04/30/20 8:36:00 CDT  Ferritin, Routine collect, 05/01/20 5:00:00 CDT, Blood, Order for future visit, Stop date 05/01/20 5:00:00 CDT, Lab Collect, Anemia, 05/01/20 5:00:00 CDT  Preventative Health Care Est 40-64 years [id number] PC, Wellness examination  HTN (hypertension)  HLD (hyperlipidemia)  Hypogonadism  Erectile dysfunction  Obstructive sleep apnea  Degenerative disc disease  Anemia  Fatigue  Benign prostate hyperplasia, HLINK AMB - AFP, 04/30/20 8:29:00 CDT  ?  9.?Fatigue?R53.83  ?Patient reports fatigue.  We will check status?of his anemia and also check his testosterone level as well as his thyroid function.  Patient advised to take a multivitamin.  Ordered:  CBC w/ Auto Diff, Routine collect, 04/30/20 8:36:00 CDT, Blood, Stop date 04/30/20 8:36:00 CDT, Lab Collect, Wellness examination  HTN (hypertension)  Anemia  Fatigue, 04/30/20 8:36:00 CDT  Comprehensive Metabolic Panel, Routine collect, 04/30/20 8:28:00 CDT, Blood, Order for future visit, Stop date 04/30/20 8:28:00 CDT, Lab Collect, Wellness examination  HTN (hypertension)  Fatigue, 04/30/20 8:28:00 CDT  Free T4, Routine collect, 04/30/20 8:36:00 CDT, Blood, Stop date 04/30/20 8:36:00 CDT, Lab Collect, Fatigue, 04/30/20 8:36:00 CDT  Preventative Health Care Est 40-64 years [id number] PC, Wellness examination  HTN (hypertension)  HLD (hyperlipidemia)  Hypogonadism  Erectile dysfunction  Obstructive sleep apnea  Degenerative disc disease  Anemia  Fatigue  Benign  prostate hyperplasia, HLINK AMB - AFP, 04/30/20 8:29:00 CDT  Testosterone Free, Adult Male-[facility], Routine collect, 04/30/20 8:28:00 CDT, Blood, Order for future visit, Stop date 04/30/20 8:28:00 CDT, Lab Collect, Fatigue  Hypogonadism, 04/30/20 8:28:00 CDT  Thyroid Stimulating Hormone, Routine collect, 04/30/20 8:36:00 CDT, Blood, Stop date 04/30/20 8:36:00 CDT, Lab Collect, Fatigue, 04/30/20 8:36:00 CDT  Urinalysis without Reflex, Routine collect, Urine, 04/30/20 8:36:00 CDT, Stop date 04/30/20 8:36:00 CDT, Nurse collect, Wellness examination  HTN (hypertension)  Fatigue  ?  10.?Benign prostate hyperplasia?N40.0  ?Patient does not desire?to take medications for this at this time.  Ordered:  [facility] Care Est 40-64 years [id number] PC, Wellness examination  HTN (hypertension)  HLD (hyperlipidemia)  Hypogonadism  Erectile dysfunction  Obstructive sleep apnea  Degenerative disc disease  Anemia  Fatigue  Benign prostate hyperplasia, HLINK AMB - AFP, 04/30/20 8:29:00 CDT  Prostate Specific Antigen, Routine collect, 04/30/20 8:36:00 CDT, Blood, Stop date 04/30/20 8:36:00 CDT, Lab Collect, Wellness examination  Benign prostate hyperplasia, 04/30/20 8:36:00 CDT  ?  Orders:  atorvastatin, See Instructions, TAKE 1 TABLET DAILY, # 90 tab(s), 3 Refill(s), Pharmacy: [facility] #[id number], 182, cm, Height/Length Dosing, 04/30/20 8:02:00 CDT, 88.7, kg, Weight Dosing, 04/30/20 8:02:00 CDT  meloxicam, 15 mg = 1 tab(s), Oral, Daily, # 90 tab(s), 3 Refill(s), Pharmacy: [facility] #[id number], 182, cm, Height/Length Dosing, 04/30/20 8:02:00 CDT, 88.7, kg, Weight Dosing, 04/30/20 8:02:00 CDT  traZODone, See Instructions, TAKE 1 TABLET BY MOUTH EVERY NIGHT AT BEDTIME, # 90 tab(s), 3 Refill(s), [id number], 182, cm, Height/Length Dosing, 04/30/20 8:02:00 CDT, 88.7, kg, Weight Dosing, 04/30/20 8:02:00 CDT  valsartan, 80 mg = 1 tab(s), Oral, Daily, # 90 tab(s), 3 Refill(s), Pharmacy:  [facility] #[id number], 182, cm, Height/Length Dosing, 04/30/20 8:02:00 CDT, 88.7, kg, Weight Dosing, 04/30/20 8:02:00 CDT  Referrals  Clinic Follow-Up Wellness, *Est. 04/30/21 3:00:00 CDT, Order for future visit, Wellness examination, HLink AFP   Problem List/Past Medical History  Ongoing  Anemia  Degenerative disc disease  Depression  Erectile dysfunction  Fatigue  HLD (hyperlipidemia)  HTN (hypertension)  Hypogonadism  Insomnia  Obstructive sleep apnea  Historical  Acute sinusitis  Hypertension  Procedure/Surgical History  Colonoscopy (07/11/2019)  Colonoscopy (2014)   Medications  atorvastatin 20 mg oral tablet, See Instructions, 3 refills  buPROPion 300 mg/24 hours (XL) oral tablet, extended release, 300 mg= 1 tab(s), Oral, Daily, 3 refills,? ?Not taking  Flexeril 5 mg oral tablet, 5 mg= 1 tab(s), Oral, Once a day (at bedtime), 1 refills  meloxicam 15 mg oral tablet, 15 mg= 1 tab(s), Oral, Daily, 3 refills  traZODone 100 mg oral tablet, See Instructions, 3 refills  valsartan 80 mg oral tablet, 80 mg= 1 tab(s), Oral, Daily, 3 refills  Allergies  No Known Allergies  Social History  Abuse/Neglect  No, 04/30/2020  Alcohol  Current, 1-2 times per week, 04/13/2018  Employment/School  Employed, Work/School description:  FOR [employer]., 04/24/2019  Exercise  Exercise frequency: 3-4 times/week., 04/30/2020  Home/Environment  Lives with Children, Spouse. Living situation: Home/Independent. Home equipment: CPAP/BiPAP., 04/24/2019  Nutrition/Health  Regular, Caffeine intake amount: 2 CUPS OF COFFEE PER DAY., 04/13/2018  Substance Use  Never, 04/13/2018  Tobacco  Former smoker, quit more than 30 days ago, Cigarettes, N/A, Smokeless Tobacco Use: Smokeless tobacco user within last 30 days., 04/30/2020  Family History  Alcoholism: Father.  Cancer: Mother.  Cirrhosis of liver: Father.  Diabetes mellitus: Father.  Hyperlipidemia 27-APR-2016 23:35:49<$>: Mother.  Hypertension: Father.  Prostate  cancer: Father.  Thyroid disease: Mother.  Immunizations  Vaccine Date Status   influenza virus vaccine, inactivated 09/02/2016 Recorded   pneumococcal 13-valent conjugate vaccine 03/27/2015 Recorded   tetanus/diphtheria/pertussis, acel(Tdap) 03/27/2015 Recorded   Health Maintenance  Health Maintenance  ???Pending?(in the next year)  ??? ??OverDue  ??? ? ? ?Diabetes Screening due??and every?  ??? ? ? ?Alcohol Misuse Screening due??01/01/20??and every 1??year(s)  ??? ? ? ?Hypertension Management-BMP due??04/23/20??and every 1??year(s)  ??? ??Due?  ??? ? ? ?ADL Screening due??04/30/20??and every 1??year(s)  ??? ? ? ?Aspirin Therapy for CVD Prevention due??04/30/20??and every 1??year(s)  ??? ??Due In Future?  ??? ? ? ?Obesity Screening not due until??01/01/21??and every 1??year(s)  ???Satisfied?(in the past 1 year)  ??? ??Satisfied?  ??? ? ? ?Blood Pressure Screening on??04/30/20.??Satisfied by Talisha Portillo LPN  ??? ? ? ?Body Mass Index Check on??04/30/20.??Satisfied by Talisha Portillo LPN  ??? ? ? ?Colorectal Screening on??08/27/19.??Satisfied by Julia Grant  ??? ? ? ?Hypertension Management-Blood Pressure on??04/30/20.??Satisfied by Talisha Portillo LPN  ??? ? ? ?Obesity Screening on??04/30/20.??Satisfied by Talisha Portillo LPN  ?

## 2022-06-22 ENCOUNTER — OFFICE VISIT (OUTPATIENT)
Dept: PRIMARY CARE CLINIC | Facility: CLINIC | Age: 62
End: 2022-06-22
Payer: COMMERCIAL

## 2022-06-22 VITALS
HEART RATE: 73 BPM | RESPIRATION RATE: 20 BRPM | DIASTOLIC BLOOD PRESSURE: 70 MMHG | SYSTOLIC BLOOD PRESSURE: 140 MMHG | BODY MASS INDEX: 26.55 KG/M2 | WEIGHT: 196 LBS | OXYGEN SATURATION: 97 % | HEIGHT: 72 IN

## 2022-06-22 DIAGNOSIS — E29.1 HYPOGONADISM IN MALE: ICD-10-CM

## 2022-06-22 DIAGNOSIS — R97.20 ELEVATED PSA: ICD-10-CM

## 2022-06-22 DIAGNOSIS — R79.89 LOW TESTOSTERONE: ICD-10-CM

## 2022-06-22 DIAGNOSIS — E83.52 SERUM CALCIUM ELEVATED: ICD-10-CM

## 2022-06-22 DIAGNOSIS — F41.9 INSOMNIA SECONDARY TO ANXIETY: ICD-10-CM

## 2022-06-22 DIAGNOSIS — M72.2 PLANTAR FASCIITIS OF RIGHT FOOT: ICD-10-CM

## 2022-06-22 DIAGNOSIS — D64.9 ANEMIA, UNSPECIFIED TYPE: ICD-10-CM

## 2022-06-22 DIAGNOSIS — F51.05 INSOMNIA SECONDARY TO ANXIETY: ICD-10-CM

## 2022-06-22 DIAGNOSIS — K42.9 UMBILICAL HERNIA WITHOUT OBSTRUCTION AND WITHOUT GANGRENE: ICD-10-CM

## 2022-06-22 DIAGNOSIS — E78.5 HYPERLIPIDEMIA, UNSPECIFIED HYPERLIPIDEMIA TYPE: ICD-10-CM

## 2022-06-22 DIAGNOSIS — M79.10 MYALGIA: ICD-10-CM

## 2022-06-22 DIAGNOSIS — M54.2 NECK PAIN: Primary | ICD-10-CM

## 2022-06-22 DIAGNOSIS — M54.9 MID BACK PAIN: ICD-10-CM

## 2022-06-22 DIAGNOSIS — Z00.00 WELLNESS EXAMINATION: ICD-10-CM

## 2022-06-22 DIAGNOSIS — F41.9 ANXIETY DISORDER, UNSPECIFIED TYPE: ICD-10-CM

## 2022-06-22 DIAGNOSIS — I10 ESSENTIAL HYPERTENSION: ICD-10-CM

## 2022-06-22 PROBLEM — G47.00 INSOMNIA: Status: ACTIVE | Noted: 2022-06-22

## 2022-06-22 PROBLEM — G47.33 OBSTRUCTIVE SLEEP APNEA SYNDROME: Status: ACTIVE | Noted: 2022-06-22

## 2022-06-22 PROCEDURE — 99204 PR OFFICE/OUTPT VISIT, NEW, LEVL IV, 45-59 MIN: ICD-10-PCS | Mod: ,,, | Performed by: STUDENT IN AN ORGANIZED HEALTH CARE EDUCATION/TRAINING PROGRAM

## 2022-06-22 PROCEDURE — 3078F DIAST BP <80 MM HG: CPT | Mod: CPTII,,, | Performed by: STUDENT IN AN ORGANIZED HEALTH CARE EDUCATION/TRAINING PROGRAM

## 2022-06-22 PROCEDURE — 1160F PR REVIEW ALL MEDS BY PRESCRIBER/CLIN PHARMACIST DOCUMENTED: ICD-10-PCS | Mod: CPTII,,, | Performed by: STUDENT IN AN ORGANIZED HEALTH CARE EDUCATION/TRAINING PROGRAM

## 2022-06-22 PROCEDURE — 4010F ACE/ARB THERAPY RXD/TAKEN: CPT | Mod: CPTII,,, | Performed by: STUDENT IN AN ORGANIZED HEALTH CARE EDUCATION/TRAINING PROGRAM

## 2022-06-22 PROCEDURE — 4010F PR ACE/ARB THEARPY RXD/TAKEN: ICD-10-PCS | Mod: CPTII,,, | Performed by: STUDENT IN AN ORGANIZED HEALTH CARE EDUCATION/TRAINING PROGRAM

## 2022-06-22 PROCEDURE — 1159F MED LIST DOCD IN RCRD: CPT | Mod: CPTII,,, | Performed by: STUDENT IN AN ORGANIZED HEALTH CARE EDUCATION/TRAINING PROGRAM

## 2022-06-22 PROCEDURE — 3077F PR MOST RECENT SYSTOLIC BLOOD PRESSURE >= 140 MM HG: ICD-10-PCS | Mod: CPTII,,, | Performed by: STUDENT IN AN ORGANIZED HEALTH CARE EDUCATION/TRAINING PROGRAM

## 2022-06-22 PROCEDURE — 99204 OFFICE O/P NEW MOD 45 MIN: CPT | Mod: ,,, | Performed by: STUDENT IN AN ORGANIZED HEALTH CARE EDUCATION/TRAINING PROGRAM

## 2022-06-22 PROCEDURE — 3008F BODY MASS INDEX DOCD: CPT | Mod: CPTII,,, | Performed by: STUDENT IN AN ORGANIZED HEALTH CARE EDUCATION/TRAINING PROGRAM

## 2022-06-22 PROCEDURE — 1160F RVW MEDS BY RX/DR IN RCRD: CPT | Mod: CPTII,,, | Performed by: STUDENT IN AN ORGANIZED HEALTH CARE EDUCATION/TRAINING PROGRAM

## 2022-06-22 PROCEDURE — 3077F SYST BP >= 140 MM HG: CPT | Mod: CPTII,,, | Performed by: STUDENT IN AN ORGANIZED HEALTH CARE EDUCATION/TRAINING PROGRAM

## 2022-06-22 PROCEDURE — 3078F PR MOST RECENT DIASTOLIC BLOOD PRESSURE < 80 MM HG: ICD-10-PCS | Mod: CPTII,,, | Performed by: STUDENT IN AN ORGANIZED HEALTH CARE EDUCATION/TRAINING PROGRAM

## 2022-06-22 PROCEDURE — 3008F PR BODY MASS INDEX (BMI) DOCUMENTED: ICD-10-PCS | Mod: CPTII,,, | Performed by: STUDENT IN AN ORGANIZED HEALTH CARE EDUCATION/TRAINING PROGRAM

## 2022-06-22 PROCEDURE — 1159F PR MEDICATION LIST DOCUMENTED IN MEDICAL RECORD: ICD-10-PCS | Mod: CPTII,,, | Performed by: STUDENT IN AN ORGANIZED HEALTH CARE EDUCATION/TRAINING PROGRAM

## 2022-06-22 RX ORDER — TRAZODONE HYDROCHLORIDE 100 MG/1
100 TABLET ORAL NIGHTLY
COMMUNITY
End: 2022-06-22 | Stop reason: SDUPTHER

## 2022-06-22 RX ORDER — ATORVASTATIN CALCIUM 20 MG/1
TABLET, FILM COATED ORAL
COMMUNITY
Start: 2021-06-25 | End: 2022-06-22 | Stop reason: SDUPTHER

## 2022-06-22 RX ORDER — ATORVASTATIN CALCIUM 20 MG/1
20 TABLET, FILM COATED ORAL NIGHTLY
Qty: 90 TABLET | Refills: 3 | Status: SHIPPED | OUTPATIENT
Start: 2022-06-22 | End: 2022-08-26

## 2022-06-22 RX ORDER — TRAZODONE HYDROCHLORIDE 100 MG/1
100 TABLET ORAL NIGHTLY PRN
Qty: 90 TABLET | Refills: 0 | Status: SHIPPED | OUTPATIENT
Start: 2022-06-22 | End: 2022-08-26

## 2022-06-22 RX ORDER — LOSARTAN POTASSIUM 50 MG/1
50 TABLET ORAL DAILY
Qty: 30 TABLET | Refills: 2 | Status: SHIPPED | OUTPATIENT
Start: 2022-06-22 | End: 2022-09-06 | Stop reason: SDUPTHER

## 2022-06-22 RX ORDER — LOSARTAN POTASSIUM 100 MG/1
100 TABLET ORAL DAILY
COMMUNITY
End: 2022-06-22 | Stop reason: SDUPTHER

## 2022-06-22 RX ORDER — BUSPIRONE HYDROCHLORIDE 10 MG/1
10 TABLET ORAL 2 TIMES DAILY PRN
Qty: 60 TABLET | Refills: 2 | Status: SHIPPED | OUTPATIENT
Start: 2022-06-22 | End: 2022-12-28

## 2022-06-22 NOTE — PROGRESS NOTES
"Subjective:       Patient ID: Vamsi Ratliff is a 62 y.o. male.    Past Medical History:  Acute sinusitis      Comment:  hx  Anxiety  Arthritis  Essential (primary) hypertension  Insomnia  Low testosterone in male  Male erectile dysfunction, unspecified  Mixed hyperlipidemia     Chief Complaint: Establish Care; Hypertension; Medication Refill; and c/o right foot pain , denies any injury    Multiple issues addressed today:    Intermittent numbness of R side of neck, just under jaw, 2-3 times/day, 2 months duration, episodes last <1 min. No other areas of numbness/tingling, no dysarthria, dysphagia, or other TIA/stroke symptoms. Has h/o neck issues, see below.    Chronic neck pain, had MRI and PT in 2016 with minimal improvement. Not currently taking/doing anything for it. Does sometimes "flare up". Used to take a muscle relaxer PRN. Also c/o mid back pain, usually mild but has severe flare ups, between shoulder blades, thinks he injured it some years ago by lifting and twisting.    Lumb near umbilicus, pt thinks he has an umbilical hernia. Not painful unless he pushes it hard. Never red, swollen. Bowels normal. No other hernias. Denies other bumps/swelling, lymph nodes, etc.    R foot/heel pain. Suspects he has plantar fasciitis. Runs frequently. Has pain worse upon walking first thing in morning. 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 Denies injury but admits he recently started trying to run more again.    Anxiety/Insomnia. Prev dx w/depression, on Wellbutrin for years but now off it for >1 yr. Denies feeling depressed, mood good, good relationship with family, energy level is "okay", denies feeling worthless, etc. Admits to anxiety, stress, muscle tension, jaw clenching, waking up middle of night worrying about work, etc. Does take trazodone for sleep but says it's not always enough    Review of Systems   Constitutional: Negative for chills, fatigue, fever and unexpected weight change.   HENT: Negative for nasal congestion, " sinus pressure/congestion and sore throat.    Eyes: Negative for pain, redness and visual disturbance.   Respiratory: Negative for cough, shortness of breath and wheezing.    Cardiovascular: Negative for chest pain, palpitations and leg swelling.   Gastrointestinal: Negative for abdominal pain, change in bowel habit, diarrhea, nausea, vomiting and change in bowel habit.   Endocrine: Negative for polydipsia and polyuria.   Genitourinary: Negative for dysuria, frequency, hematuria and urgency.   Musculoskeletal: Positive for back pain, neck pain and neck stiffness. Negative for myalgias.   Integumentary:  Negative for color change and rash.   Neurological: Positive for numbness (numb/tingling patch on anterior R neck just below jawline). Negative for dizziness, syncope, weakness and headaches.   Hematological: Negative for adenopathy. Does not bruise/bleed easily.   Psychiatric/Behavioral: Positive for sleep disturbance. Negative for confusion. The patient is nervous/anxious.            Objective:      Physical Exam  Vitals and nursing note reviewed.   Constitutional:       General: He is not in acute distress.     Appearance: He is not ill-appearing.   HENT:      Nose: No congestion or rhinorrhea.      Mouth/Throat:      Mouth: Mucous membranes are moist.      Pharynx: No oropharyngeal exudate or posterior oropharyngeal erythema.   Eyes:      Extraocular Movements: Extraocular movements intact.      Conjunctiva/sclera: Conjunctivae normal.      Pupils: Pupils are equal, round, and reactive to light.   Neck:      Comments: Limited ROM most notable with limited L lateral rotation  Cardiovascular:      Rate and Rhythm: Normal rate and regular rhythm.      Pulses: Normal pulses.      Heart sounds: No murmur heard.  Pulmonary:      Effort: Pulmonary effort is normal.      Breath sounds: Normal breath sounds.   Abdominal:      Palpations: Abdomen is soft. There is no mass.      Tenderness: There is no abdominal tenderness.       Hernia: A hernia (1.5 cm reducible umbilical hernia) is present.   Musculoskeletal:         General: No deformity or signs of injury.      Right lower leg: No edema.      Left lower leg: No edema.   Lymphadenopathy:      Cervical: No cervical adenopathy.   Skin:     General: Skin is warm and dry.      Findings: No rash.   Neurological:      General: No focal deficit present.      Mental Status: He is alert. Mental status is at baseline.      Cranial Nerves: No cranial nerve deficit.   Psychiatric:         Mood and Affect: Mood normal.         Behavior: Behavior normal.           Assessment & Plan:   1. Neck pain  Assessment & Plan:  Chronic. Had MRI in 2016. Saw PT with minimal improvement. Never saw specialist.  May be causing numbness/tingling on R side of neck.  Start with new XR and proceed from there    Orders:  -     X-Ray Cervical Spine AP And Lateral    2. Mid back pain  Assessment & Plan:  Chronic, pt thinks he injured it a few years ago lifting/twisting  Start with XR thoracic spine    Orders:  -     X-Ray Thoracic Spine AP Lateral    3. Anxiety disorder, unspecified type  Assessment & Plan:  Previously on Wellbutrin (pt says prev dx w/depression, but he says he never thought he really had depression). Off it for 1 year now. Doesn't feel much different.  Admits to irritability, jaw clenching, sleep issue, intrusive thoughts, excess worry about work issues, etc.  Trial of Buspar 10 mg for anxiety. Start at night only, may help with sleep.     Orders:  -     busPIRone (BUSPAR) 10 MG tablet    4. Insomnia secondary to anxiety  -     traZODone (DESYREL) 100 MG tablet    5. Essential hypertension  Assessment & Plan:  Off treatment for >1 yr  BP elevated today  Previously on losartan 100 mg daily. I think this is a little too high given his BP today. Will restart at 50 mg daily and recheck BP at next visit    Orders:  -     CBC Auto Differential  -     Comprehensive Metabolic Panel  -     losartan (COZAAR)  50 MG tablet    6. Hyperlipidemia, unspecified hyperlipidemia type  Assessment & Plan:  Obtaining baseline lipid panel  Will restart atorvastatin 20 mg for now, adjust based on lipids    Orders:  -     atorvastatin (LIPITOR) 20 MG tablet  -     Lipid Panel    7. Plantar fasciitis of right foot  Assessment & Plan:  Counseled on conservative measures including Dr. Hendrickson Plantar Fasciitis insoles, stretches, icing, etc.   Consider XR heel to look for spurs if symptoms continue  Saw podiatry in past for L food lemon neuroma      8. Low testosterone  -     Testosterone    9. Wellness examination  Comments:  ordered labs in prep for wellness at next visit  Orders:  -     PSA, Screening  -     TSH  -     Urinalysis  -     Hemoglobin A1C    10. Hypogonadism in male  Assessment & Plan:  Previously on testosterone injections from Urologist. Off now for >1 yr.  Check baseline testosterone level      11. Anemia, unspecified type  Assessment & Plan:  Worked up extensively in past. No clear etiology. Even had EGD, c-scope, and capsule endoscopy.  Check baseline CBC       12. Umbilical hernia without obstruction and without gangrene  Assessment & Plan:  Small (approx 1.5 cm), reducible, non-tender      RTC in 2 mo

## 2022-06-22 NOTE — ASSESSMENT & PLAN NOTE
Worked up extensively in past. No clear etiology. Even had EGD, c-scope, and capsule endoscopy.  Check baseline CBC

## 2022-06-22 NOTE — ASSESSMENT & PLAN NOTE
Chronic. Had MRI in 2016. Saw PT with minimal improvement. Never saw specialist.  May be causing numbness/tingling on R side of neck.  Start with new XR and proceed from there

## 2022-06-22 NOTE — ASSESSMENT & PLAN NOTE
Previously on Wellbutrin (pt says prev dx w/depression, but he says he never thought he really had depression). Off it for 1 year now. Doesn't feel much different.  Admits to irritability, jaw clenching, sleep issue, intrusive thoughts, excess worry about work issues, etc.  Trial of Buspar 10 mg for anxiety. Start at night only, may help with sleep.

## 2022-06-22 NOTE — ASSESSMENT & PLAN NOTE
Counseled on conservative measures including Dr. Hendrickson Plantar Fasciitis insoles, stretches, icing, etc.   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

## 2022-06-22 NOTE — ASSESSMENT & PLAN NOTE
Off treatment for >1 yr  BP elevated today  Previously on losartan 100 mg daily. I think this is a little too high given his BP today. Will restart at 50 mg daily and recheck BP at next visit

## 2022-06-22 NOTE — ASSESSMENT & PLAN NOTE
Previously on testosterone injections from Urologist. Off now for >1 yr.  Check baseline testosterone level

## 2022-06-27 NOTE — PROGRESS NOTES
Spent about 10 minutes discussing results and plan with patient this morning.     Labs:  1. High calcium (10.4) - encouraged hydration. Repeat labs in 1-2 weeks.  2. High cholesterol - ASCVD score 12.0%. Needs high intensity statin. Will increase atorvastatin to 40 mg (but having muscle soreness, see below).  3. High PSA (5.32) - referring to Urology    Remainder of labs unremarkable.    XR thoracic spine and cervical spine both with moderate disc disease. Discussed options with patient. We'll manage conservatively and consider MRI +/- referral if symptoms worsen in future.    We discussed his BP and how we lowered losartan to 50 mg. He'll monitor his BP at home for the next 2 weeks then report those values to the clinic.    Lastly, pt reports that he has myaglias for the past 3-4 days despite not exercising. Unfortunately, this may be statin induced myopathy. We'll hold his atorvastatin for 2 weeks to see if symptoms improve. We'll also check a CK level. If symptoms improve, will have to look for alternative treatments. Could try a hydrophilic statin like rosuvastatin instead.    Pt expressed understanding of all of above.    Brian Amado MD

## 2022-06-30 ENCOUNTER — TELEPHONE (OUTPATIENT)
Dept: PRIMARY CARE CLINIC | Facility: CLINIC | Age: 62
End: 2022-06-30
Payer: COMMERCIAL

## 2022-06-30 NOTE — TELEPHONE ENCOUNTER
----- Message from Jose Martin Jalloh sent at 6/30/2022  1:04 PM CDT -----  Regarding: Return Call  Type:  Patient Returning Call    Who Called: pt   Who Left Message for Patient: Dr. Alicia  Does the patient know what this is regarding?: yes  Would the patient rather a call back or a response via [facility]?  call  Best Call Back Number: [phone]  Additional Information:  pt returning call from Ravin

## 2022-06-30 NOTE — TELEPHONE ENCOUNTER
Copy of referral and last 3 PSA levels fax to [facility] as requested .

## 2022-07-13 PROCEDURE — 82550 ASSAY OF CK (CPK): CPT | Performed by: STUDENT IN AN ORGANIZED HEALTH CARE EDUCATION/TRAINING PROGRAM

## 2022-07-13 PROCEDURE — 80053 COMPREHEN METABOLIC PANEL: CPT | Performed by: STUDENT IN AN ORGANIZED HEALTH CARE EDUCATION/TRAINING PROGRAM

## 2022-07-19 NOTE — PROGRESS NOTES
Called pt today to discuss repeat labs.     1. Calcium normalized, no further workup at this time  2. Myalgias resolved, pt restarted his atorvastatin with no myalgias. CPK normal. No further workup  3. Sodium slightly low at 132. Pt not on diuretics, no BP issues, confusion, HA, etc. Normal renal fx. Will loosely monitor this. Consider repeat BMP +/- serum osmolality at next visit.     Has appt with Urology in about 1 week  Has f/u with me in about 1 month    All questions answered    Brian Amado MD

## 2022-08-26 ENCOUNTER — OFFICE VISIT (OUTPATIENT)
Dept: PRIMARY CARE CLINIC | Facility: CLINIC | Age: 62
End: 2022-08-26
Payer: COMMERCIAL

## 2022-08-26 ENCOUNTER — TELEPHONE (OUTPATIENT)
Dept: PRIMARY CARE CLINIC | Facility: CLINIC | Age: 62
End: 2022-08-26

## 2022-08-26 ENCOUNTER — DOCUMENTATION ONLY (OUTPATIENT)
Dept: PRIMARY CARE CLINIC | Facility: CLINIC | Age: 62
End: 2022-08-26

## 2022-08-26 VITALS
SYSTOLIC BLOOD PRESSURE: 138 MMHG | DIASTOLIC BLOOD PRESSURE: 82 MMHG | TEMPERATURE: 98 F | HEIGHT: 72 IN | RESPIRATION RATE: 20 BRPM | WEIGHT: 202 LBS | BODY MASS INDEX: 27.36 KG/M2 | HEART RATE: 77 BPM | OXYGEN SATURATION: 97 %

## 2022-08-26 DIAGNOSIS — F51.05 INSOMNIA SECONDARY TO ANXIETY: ICD-10-CM

## 2022-08-26 DIAGNOSIS — Z00.00 WELLNESS EXAMINATION: Primary | ICD-10-CM

## 2022-08-26 DIAGNOSIS — E78.00 HYPERCHOLESTEROLEMIA WITH LDL GREATER THAN 190 MG/DL: ICD-10-CM

## 2022-08-26 DIAGNOSIS — F41.9 INSOMNIA SECONDARY TO ANXIETY: ICD-10-CM

## 2022-08-26 DIAGNOSIS — M18.0 PRIMARY OSTEOARTHRITIS OF BOTH FIRST CARPOMETACARPAL JOINTS: ICD-10-CM

## 2022-08-26 PROCEDURE — 3079F DIAST BP 80-89 MM HG: CPT | Mod: CPTII,,, | Performed by: STUDENT IN AN ORGANIZED HEALTH CARE EDUCATION/TRAINING PROGRAM

## 2022-08-26 PROCEDURE — 3079F PR MOST RECENT DIASTOLIC BLOOD PRESSURE 80-89 MM HG: ICD-10-PCS | Mod: CPTII,,, | Performed by: STUDENT IN AN ORGANIZED HEALTH CARE EDUCATION/TRAINING PROGRAM

## 2022-08-26 PROCEDURE — 3075F PR MOST RECENT SYSTOLIC BLOOD PRESS GE 130-139MM HG: ICD-10-PCS | Mod: CPTII,,, | Performed by: STUDENT IN AN ORGANIZED HEALTH CARE EDUCATION/TRAINING PROGRAM

## 2022-08-26 PROCEDURE — 99396 PREV VISIT EST AGE 40-64: CPT | Mod: ,,, | Performed by: STUDENT IN AN ORGANIZED HEALTH CARE EDUCATION/TRAINING PROGRAM

## 2022-08-26 PROCEDURE — 99396 PR PREVENTIVE VISIT,EST,40-64: ICD-10-PCS | Mod: ,,, | Performed by: STUDENT IN AN ORGANIZED HEALTH CARE EDUCATION/TRAINING PROGRAM

## 2022-08-26 PROCEDURE — 4010F PR ACE/ARB THEARPY RXD/TAKEN: ICD-10-PCS | Mod: CPTII,,, | Performed by: STUDENT IN AN ORGANIZED HEALTH CARE EDUCATION/TRAINING PROGRAM

## 2022-08-26 PROCEDURE — 3008F BODY MASS INDEX DOCD: CPT | Mod: CPTII,,, | Performed by: STUDENT IN AN ORGANIZED HEALTH CARE EDUCATION/TRAINING PROGRAM

## 2022-08-26 PROCEDURE — 3008F PR BODY MASS INDEX (BMI) DOCUMENTED: ICD-10-PCS | Mod: CPTII,,, | Performed by: STUDENT IN AN ORGANIZED HEALTH CARE EDUCATION/TRAINING PROGRAM

## 2022-08-26 PROCEDURE — 4010F ACE/ARB THERAPY RXD/TAKEN: CPT | Mod: CPTII,,, | Performed by: STUDENT IN AN ORGANIZED HEALTH CARE EDUCATION/TRAINING PROGRAM

## 2022-08-26 PROCEDURE — 3075F SYST BP GE 130 - 139MM HG: CPT | Mod: CPTII,,, | Performed by: STUDENT IN AN ORGANIZED HEALTH CARE EDUCATION/TRAINING PROGRAM

## 2022-08-26 RX ORDER — TRAZODONE HYDROCHLORIDE 150 MG/1
150 TABLET ORAL NIGHTLY PRN
Qty: 90 TABLET | Refills: 1 | Status: SHIPPED | OUTPATIENT
Start: 2022-08-26 | End: 2022-12-02

## 2022-08-26 RX ORDER — ATORVASTATIN CALCIUM 40 MG/1
40 TABLET, FILM COATED ORAL NIGHTLY
Qty: 90 TABLET | Refills: 3 | Status: SHIPPED | OUTPATIENT
Start: 2022-08-26 | End: 2023-09-08 | Stop reason: SDUPTHER

## 2022-08-26 RX ORDER — ATORVASTATIN CALCIUM 20 MG/1
40 TABLET, FILM COATED ORAL NIGHTLY
Qty: 90 TABLET | Refills: 3 | Status: SHIPPED | OUTPATIENT
Start: 2022-08-26 | End: 2022-08-26

## 2022-08-26 RX ORDER — DICLOFENAC SODIUM 10 MG/G
2 GEL TOPICAL 4 TIMES DAILY PRN
Qty: 200 G | Refills: 11 | Status: SHIPPED | OUTPATIENT
Start: 2022-08-26

## 2022-08-26 RX ORDER — TAMSULOSIN HYDROCHLORIDE 0.4 MG/1
1 CAPSULE ORAL NIGHTLY
COMMUNITY
Start: 2022-07-29

## 2022-08-26 NOTE — ASSESSMENT & PLAN NOTE
Increase trazodone to 150 mg nightly  Some of his awakenings are for nocturia, Urology started Flomax about 4 weeks ago so hopefully this effect will build and help him

## 2022-08-26 NOTE — PROGRESS NOTES
"ANNUAL WELLNESS NOTE    Chief Complaint:  2 mos f/u for wellness, " not sleeping all night " requesting increase in Traz, colonscopy 2020 Dr Beauchamp, and lab review     HPI:  Vamis Ratliff is a 62 y.o. male    ----------------------------  Acute sinusitis      Comment:  hx  Anxiety  Arthritis  Essential (primary) hypertension  Insomnia  Low testosterone in male  Male erectile dysfunction, unspecified  Mixed hyperlipidemia    Patient Care Team:  Brian Amado MD as PCP - General (Internal Medicine)  Gregg Beauchamp MD as Consulting Physician (Gastroenterology)    Presents today for wellness visit. Describes overall health as good. Diet is healthy. Exercises 3-4 times per week, walking, body weight exercise, light weights. Tobacco use: chewing tobacco on occassion . Alcohol use: usually about 12 beers on weekend. Caffeine intake: 2-3 caffeinated drinks daily. Eye exam: annually (wears contacts). Dental exam: twice annually, currently overdue.    C/o not sleeping through the night. Frequent nocturnal awakenings. Trazodone 100 mg seems to have helped, but he'd like to try a higher dose. Also c/o pain at bases of B/L thumbs, says he was told it was arthritis in past, not currently taking anything for it.       Review of Systems   Constitutional: Negative for chills, fever and unexpected weight change.   HENT: Negative for sinus pressure/congestion and sore throat.    Eyes: Negative for pain and redness.   Respiratory: Negative for cough, shortness of breath and wheezing.    Cardiovascular: Negative for chest pain and leg swelling.   Gastrointestinal: Negative for abdominal pain, nausea and vomiting.   Endocrine: Negative for polydipsia and polyuria.   Genitourinary: Negative for dysuria and hematuria.   Musculoskeletal: Positive for arthralgias (pain of B/L thumbs). Negative for myalgias.   Integumentary:  Negative for rash and mole/lesion.   Neurological: Negative for dizziness, syncope and headaches. "   Hematological: Negative for adenopathy. Does not bruise/bleed easily.   Psychiatric/Behavioral: Positive for sleep disturbance. Negative for confusion.       Physical Exam  Vitals and nursing note reviewed.   Constitutional:       General: He is not in acute distress.     Appearance: He is not ill-appearing.   HENT:      Mouth/Throat:      Mouth: Mucous membranes are moist.      Pharynx: No oropharyngeal exudate or posterior oropharyngeal erythema.   Eyes:      Extraocular Movements: Extraocular movements intact.      Conjunctiva/sclera: Conjunctivae normal.      Pupils: Pupils are equal, round, and reactive to light.   Neck:      Comments: Limited ROM most notable with limited L lateral rotation  Cardiovascular:      Rate and Rhythm: Normal rate and regular rhythm.      Pulses: Normal pulses.      Heart sounds: No murmur heard.  Pulmonary:      Effort: Pulmonary effort is normal.      Breath sounds: Normal breath sounds.   Abdominal:      Palpations: Abdomen is soft. There is no mass.      Tenderness: There is no abdominal tenderness.      Hernia: A hernia (1.5 cm reducible umbilical hernia) is present.   Musculoskeletal:         General: No deformity.      Right lower leg: No edema.      Left lower leg: No edema.   Lymphadenopathy:      Cervical: No cervical adenopathy.   Skin:     General: Skin is warm and dry.      Findings: No rash.   Neurological:      General: No focal deficit present.      Mental Status: He is alert. Mental status is at baseline.   Psychiatric:         Mood and Affect: Mood normal.         Behavior: Behavior normal.         Health Maintenance:  Vaccinations:   - Flu: does not typically receive   - Pneumonia:     - Shingles (>50): Received 2019   - Tdap: Received 2015   - COVID: did not receive  Screening:   - Prostate (>50): PSA was elevated, Following Urology now and biopsy planned   - Lung Ca. Screening (50-80 with >20 pack yrs current or quit <15 yrs):    - Colon Ca. Screening (age  >45): C-scope 2019 w/single polyp, was told to repeat in 10 years, due 2029   - AAA (65-75 if ever smoked):     Assessment/Plan:  1. Wellness examination  Comments:  reviewed recent labs  UTD on screenings and vaccines    2. Insomnia secondary to anxiety  Assessment & Plan:  Increase trazodone to 150 mg nightly  Some of his awakenings are for nocturia, Urology started Flomax about 4 weeks ago so hopefully this effect will build and help him    Orders:  -     traZODone (DESYREL) 150 MG tablet    3. Hypercholesterolemia with LDL greater than 190 mg/dL  Assessment & Plan:  Compliant with statin - no further myalgias (reported shortly after last visit, see previous clinic note addendum).  Will repeat lipid panel in future.   For now, will obtain CT Calcium score to further assess cardiac risk and determine if high intensity statin needed or referral to Cards needed    Orders:  -     CT Calcium Scoring Cardiac  -     atorvastatin (LIPITOR) 40 MG tablet    4. Primary osteoarthritis of both first carpometacarpal joints  -     diclofenac sodium (VOLTAREN) 1 % Gel

## 2022-08-26 NOTE — ASSESSMENT & PLAN NOTE
Compliant with statin - no further myalgias (reported shortly after last visit, see previous clinic note addendum).  Will repeat lipid panel in future.   For now, will obtain CT Calcium score to further assess cardiac risk and determine if high intensity statin needed or referral to Cards needed

## 2022-09-06 DIAGNOSIS — I10 ESSENTIAL HYPERTENSION: ICD-10-CM

## 2022-09-06 RX ORDER — LOSARTAN POTASSIUM 50 MG/1
50 TABLET ORAL DAILY
Qty: 30 TABLET | Refills: 0 | Status: SHIPPED | OUTPATIENT
Start: 2022-09-06 | End: 2022-09-06

## 2022-09-09 ENCOUNTER — PATIENT MESSAGE (OUTPATIENT)
Dept: PRIMARY CARE CLINIC | Facility: CLINIC | Age: 62
End: 2022-09-09
Payer: COMMERCIAL

## 2022-09-09 ENCOUNTER — HOSPITAL ENCOUNTER (OUTPATIENT)
Dept: RADIOLOGY | Facility: HOSPITAL | Age: 62
Discharge: HOME | End: 2022-09-09
Attending: STUDENT IN AN ORGANIZED HEALTH CARE EDUCATION/TRAINING PROGRAM
Payer: COMMERCIAL

## 2022-09-09 DIAGNOSIS — E78.00 HYPERCHOLESTEROLEMIA WITH LDL GREATER THAN 190 MG/DL: ICD-10-CM

## 2022-09-09 PROCEDURE — 75571 CT HRT W/O DYE W/CA TEST: CPT | Mod: TC

## 2023-03-15 ENCOUNTER — OFFICE VISIT (OUTPATIENT)
Dept: PRIMARY CARE CLINIC | Facility: CLINIC | Age: 63
End: 2023-03-15
Payer: COMMERCIAL

## 2023-03-15 DIAGNOSIS — R93.1 AGATSTON CORONARY ARTERY CALCIUM SCORE BETWEEN 200 AND 399: Primary | ICD-10-CM

## 2023-03-15 DIAGNOSIS — E78.00 HYPERCHOLESTEROLEMIA WITH LDL GREATER THAN 190 MG/DL: ICD-10-CM

## 2023-03-15 DIAGNOSIS — Z00.00 WELLNESS EXAMINATION: ICD-10-CM

## 2023-03-15 PROCEDURE — 1160F RVW MEDS BY RX/DR IN RCRD: CPT | Mod: CPTII,95,, | Performed by: STUDENT IN AN ORGANIZED HEALTH CARE EDUCATION/TRAINING PROGRAM

## 2023-03-15 PROCEDURE — 1159F MED LIST DOCD IN RCRD: CPT | Mod: CPTII,95,, | Performed by: STUDENT IN AN ORGANIZED HEALTH CARE EDUCATION/TRAINING PROGRAM

## 2023-03-15 PROCEDURE — 1159F PR MEDICATION LIST DOCUMENTED IN MEDICAL RECORD: ICD-10-PCS | Mod: CPTII,95,, | Performed by: STUDENT IN AN ORGANIZED HEALTH CARE EDUCATION/TRAINING PROGRAM

## 2023-03-15 PROCEDURE — 4010F ACE/ARB THERAPY RXD/TAKEN: CPT | Mod: CPTII,95,, | Performed by: STUDENT IN AN ORGANIZED HEALTH CARE EDUCATION/TRAINING PROGRAM

## 2023-03-15 PROCEDURE — 1160F PR REVIEW ALL MEDS BY PRESCRIBER/CLIN PHARMACIST DOCUMENTED: ICD-10-PCS | Mod: CPTII,95,, | Performed by: STUDENT IN AN ORGANIZED HEALTH CARE EDUCATION/TRAINING PROGRAM

## 2023-03-15 PROCEDURE — 4010F PR ACE/ARB THEARPY RXD/TAKEN: ICD-10-PCS | Mod: CPTII,95,, | Performed by: STUDENT IN AN ORGANIZED HEALTH CARE EDUCATION/TRAINING PROGRAM

## 2023-03-15 PROCEDURE — 99213 OFFICE O/P EST LOW 20 MIN: CPT | Mod: 95,,, | Performed by: STUDENT IN AN ORGANIZED HEALTH CARE EDUCATION/TRAINING PROGRAM

## 2023-03-15 PROCEDURE — 99213 PR OFFICE/OUTPT VISIT, EST, LEVL III, 20-29 MIN: ICD-10-PCS | Mod: 95,,, | Performed by: STUDENT IN AN ORGANIZED HEALTH CARE EDUCATION/TRAINING PROGRAM

## 2023-03-15 NOTE — PROGRESS NOTES
Established Patient - Audio Only Telehealth Visit     The patient location is: [place]  The chief complaint leading to consultation is: HTN, HLD, CT Calcium Score f/u.  Visit type: Virtual visit with audio only (telephone)  Total time spent with patient: 17 minutes      The reason for the audio only service rather than synchronous audio and video virtual visit was related to technical difficulties or patient preference/necessity.     Each patient to whom I provide medical services by telemedicine is:  (1) informed of the relationship between the physician and patient and the respective role of any other health care provider with respect to management of the patient; and (2) notified that they may decline to receive medical services by telemedicine and may withdraw from such care at any time. Patient verbally consented to receive this service via voice-only telephone call.       HPI:   No new complaints. We completed a CT calcium score after wellness visit in August. CT Calcium score was 227. His 10 year BROCK Score was 15%. I sent a [facility] Wellness message recommending a referral to Cardiology. Message was read but not responded to. He remains compliant with his Lipitor 40 mg daily and does follow a low cholesterol diet. He continues to exercise.     Review of Systems   Constitutional:  Negative for activity change and unexpected weight change.   HENT:  Negative for hearing loss, rhinorrhea and trouble swallowing.    Eyes:  Negative for discharge and visual disturbance.   Respiratory:  Negative for chest tightness and wheezing.    Cardiovascular:  Negative for chest pain and palpitations.   Gastrointestinal:  Negative for blood in stool, constipation, diarrhea and vomiting.   Endocrine: Negative for polydipsia and polyuria.   Genitourinary:  Negative for difficulty urinating, hematuria and urgency.   Musculoskeletal:  Positive for arthralgias and neck pain. Negative for joint swelling.   Neurological:  Negative for  weakness and headaches.   Psychiatric/Behavioral:  Negative for confusion and dysphoric mood.      Physical Exam:  Constitutional: No apparent distress, alert and oriented x3  Respiratory: No obvious shortness of breath, speaking in full sentences without difficulty  Neurologic: Speech clear and cognition intact   Psychiatric: Mood is appropriate     Assessment and plan:    1. Agatston coronary artery calcium score between 200 and 399  Assessment & Plan:  Discussed Cardiology referral today. Pt declines. He wishes to repeat the calcium score (which we can do in approx 3 years given his high risk). He'd like to stay on current statin and keep working on lifestyle modifications      2. Hypercholesterolemia with LDL greater than 190 mg/dL  Assessment & Plan:  Compliant with Lipitor 40 mg daily. Continue this.    Orders:  -     Lipid Panel; Future; Expected date: 08/22/2023    3. Wellness examination  Comments:  ordering labs in prep for wellness at next visit  Orders:  -     CBC Auto Differential; Future; Expected date: 08/22/2023  -     Comprehensive Metabolic Panel; Future; Expected date: 08/22/2023  -     Hemoglobin A1C; Future; Expected date: 08/22/2023  -     Lipid Panel; Future; Expected date: 08/22/2023  -     PSA, Screening; Future; Expected date: 08/22/2023  -     TSH; Future; Expected date: 08/22/2023  -     Urinalysis; Future; Expected date: 08/22/2023      Follow up in about 6 months (around 9/15/2023) for Wellness.      This service was not originating from a related E/M service provided within the previous 7 days nor will  to an E/M service or procedure within the next 24 hours or my soonest available appointment.  Prevailing standard of care was able to be met in this audio-only visit.

## 2023-03-22 PROBLEM — R93.1 AGATSTON CORONARY ARTERY CALCIUM SCORE BETWEEN 200 AND 399: Status: ACTIVE | Noted: 2023-03-22

## 2023-03-23 NOTE — ASSESSMENT & PLAN NOTE
Discussed Cardiology referral today. Pt declines. He wishes to repeat the calcium score (which we can do in approx 3 years given his high risk). He'd like to stay on current statin and keep working on lifestyle modifications

## 2023-08-21 DIAGNOSIS — I10 ESSENTIAL HYPERTENSION: ICD-10-CM

## 2023-08-21 NOTE — TELEPHONE ENCOUNTER
Please see the attached refill request.  Cancelled apt for 7/2023  Last appt virtual 3/2023 ( supposed to return on 7/2023)   Patient scheduled for 9/8

## 2023-08-22 RX ORDER — LOSARTAN POTASSIUM 50 MG/1
TABLET ORAL
Qty: 90 TABLET | Refills: 0 | Status: SHIPPED | OUTPATIENT
Start: 2023-08-22 | End: 2023-09-08 | Stop reason: SDUPTHER

## 2023-09-01 ENCOUNTER — TELEPHONE (OUTPATIENT)
Dept: PRIMARY CARE CLINIC | Facility: CLINIC | Age: 63
End: 2023-09-01
Payer: COMMERCIAL

## 2023-09-01 NOTE — TELEPHONE ENCOUNTER
No answer/left message on 9/1/23 at 8:28 am reminding patient about upcoming visit with labs needed prior to appointment. Lab order in chart

## 2023-09-01 NOTE — LETTER
AUTHORIZATION FOR RELEASE OF   CONFIDENTIAL INFORMATION    Dear Dr. Roth,    We are seeing Vamsi Ratliff, date of birth 1960, in the clinic at [facility]. Brian Amado MD is the patient's PCP. Vamsi Ratliff has an outstanding lab/procedure at the time we reviewed his chart. In order to help keep his health information updated, he has authorized us to request the following medical record(s):        (  )  MAMMOGRAM                                      (  )  COLONOSCOPY      (  )  PAP SMEAR                                          (  )  OUTSIDE LAB RESULTS     (  )  DEXA SCAN                                          (  )  EYE EXAM            (  )  FOOT EXAM                                          (  )  ENTIRE RECORD     (  )  OUTSIDE IMMUNIZATIONS                 (X  ) last office note         Please fax records to [facility], [place], Jeffrey D, MD, [phone]     If you have any questions, please contact [facility] at [phone].           Patient Name: Vamsi Ratliff  : 1960  Patient Phone #: [phone]

## 2023-09-06 ENCOUNTER — LAB VISIT (OUTPATIENT)
Dept: LAB | Facility: HOSPITAL | Age: 63
End: 2023-09-06
Attending: STUDENT IN AN ORGANIZED HEALTH CARE EDUCATION/TRAINING PROGRAM
Payer: COMMERCIAL

## 2023-09-06 DIAGNOSIS — E78.00 HYPERCHOLESTEROLEMIA WITH LDL GREATER THAN 190 MG/DL: ICD-10-CM

## 2023-09-06 DIAGNOSIS — Z00.00 WELLNESS EXAMINATION: ICD-10-CM

## 2023-09-06 LAB
ALBUMIN SERPL-MCNC: 4.4 G/DL (ref 3.4–4.8)
ALBUMIN/GLOB SERPL: 1.9 RATIO (ref 1.1–2)
ALP SERPL-CCNC: 48 UNIT/L (ref 40–150)
ALT SERPL-CCNC: 33 UNIT/L (ref 0–55)
APPEARANCE UR: CLEAR
AST SERPL-CCNC: 23 UNIT/L (ref 5–34)
BACTERIA #/AREA URNS AUTO: NORMAL /HPF
BASOPHILS # BLD AUTO: 0.04 X10(3)/MCL
BASOPHILS NFR BLD AUTO: 0.8 %
BILIRUB SERPL-MCNC: 0.5 MG/DL
BILIRUB UR QL STRIP.AUTO: NEGATIVE
BUN SERPL-MCNC: 18.9 MG/DL (ref 8.4–25.7)
CALCIUM SERPL-MCNC: 9.7 MG/DL (ref 8.8–10)
CHLORIDE SERPL-SCNC: 103 MMOL/L (ref 98–107)
CHOLEST SERPL-MCNC: 186 MG/DL
CHOLEST/HDLC SERPL: 4 {RATIO} (ref 0–5)
CO2 SERPL-SCNC: 28 MMOL/L (ref 23–31)
COLOR UR: YELLOW
CREAT SERPL-MCNC: 1.12 MG/DL (ref 0.73–1.18)
EOSINOPHIL # BLD AUTO: 0.2 X10(3)/MCL (ref 0–0.9)
EOSINOPHIL NFR BLD AUTO: 4.2 %
ERYTHROCYTE [DISTWIDTH] IN BLOOD BY AUTOMATED COUNT: 12.8 % (ref 11.5–17)
EST. AVERAGE GLUCOSE BLD GHB EST-MCNC: 108.3 MG/DL
GFR SERPLBLD CREATININE-BSD FMLA CKD-EPI: >60 MLS/MIN/1.73/M2
GLOBULIN SER-MCNC: 2.3 GM/DL (ref 2.4–3.5)
GLUCOSE SERPL-MCNC: 109 MG/DL (ref 82–115)
GLUCOSE UR QL STRIP.AUTO: NEGATIVE
HBA1C MFR BLD: 5.4 %
HCT VFR BLD AUTO: 42.7 % (ref 42–52)
HDLC SERPL-MCNC: 51 MG/DL (ref 35–60)
HGB BLD-MCNC: 14.2 G/DL (ref 14–18)
IMM GRANULOCYTES # BLD AUTO: 0.01 X10(3)/MCL (ref 0–0.04)
IMM GRANULOCYTES NFR BLD AUTO: 0.2 %
KETONES UR QL STRIP.AUTO: NEGATIVE
LDLC SERPL CALC-MCNC: 107 MG/DL (ref 50–140)
LEUKOCYTE ESTERASE UR QL STRIP.AUTO: NEGATIVE
LYMPHOCYTES # BLD AUTO: 1.56 X10(3)/MCL (ref 0.6–4.6)
LYMPHOCYTES NFR BLD AUTO: 32.5 %
MCH RBC QN AUTO: 32.1 PG (ref 27–31)
MCHC RBC AUTO-ENTMCNC: 33.3 G/DL (ref 33–36)
MCV RBC AUTO: 96.4 FL (ref 80–94)
MONOCYTES # BLD AUTO: 0.32 X10(3)/MCL (ref 0.1–1.3)
MONOCYTES NFR BLD AUTO: 6.7 %
NEUTROPHILS # BLD AUTO: 2.67 X10(3)/MCL (ref 2.1–9.2)
NEUTROPHILS NFR BLD AUTO: 55.6 %
NITRITE UR QL STRIP.AUTO: NEGATIVE
NRBC BLD AUTO-RTO: 0 %
PH UR STRIP.AUTO: 6 [PH]
PLATELET # BLD AUTO: 181 X10(3)/MCL (ref 130–400)
PMV BLD AUTO: 9.6 FL (ref 7.4–10.4)
POTASSIUM SERPL-SCNC: 4.5 MMOL/L (ref 3.5–5.1)
PROT SERPL-MCNC: 6.7 GM/DL (ref 5.8–7.6)
PROT UR QL STRIP.AUTO: NEGATIVE
PSA SERPL-MCNC: 5.16 NG/ML
RBC # BLD AUTO: 4.43 X10(6)/MCL (ref 4.7–6.1)
RBC #/AREA URNS AUTO: NORMAL /HPF
RBC UR QL AUTO: NEGATIVE
SODIUM SERPL-SCNC: 138 MMOL/L (ref 136–145)
SP GR UR STRIP.AUTO: 1.02 (ref 1–1.03)
SQUAMOUS #/AREA URNS AUTO: NORMAL /HPF
TRIGL SERPL-MCNC: 140 MG/DL (ref 34–140)
TSH SERPL-ACNC: 1.98 UIU/ML (ref 0.35–4.94)
UROBILINOGEN UR STRIP-ACNC: 0.2
VLDLC SERPL CALC-MCNC: 28 MG/DL
WBC # SPEC AUTO: 4.8 X10(3)/MCL (ref 4.5–11.5)
WBC #/AREA URNS AUTO: NORMAL /HPF

## 2023-09-06 PROCEDURE — 80061 LIPID PANEL: CPT

## 2023-09-06 PROCEDURE — 36415 COLL VENOUS BLD VENIPUNCTURE: CPT

## 2023-09-06 PROCEDURE — 85025 COMPLETE CBC W/AUTO DIFF WBC: CPT

## 2023-09-06 PROCEDURE — 80053 COMPREHEN METABOLIC PANEL: CPT

## 2023-09-06 PROCEDURE — 84443 ASSAY THYROID STIM HORMONE: CPT

## 2023-09-06 PROCEDURE — 81001 URINALYSIS AUTO W/SCOPE: CPT

## 2023-09-06 PROCEDURE — 83036 HEMOGLOBIN GLYCOSYLATED A1C: CPT

## 2023-09-06 PROCEDURE — 84153 ASSAY OF PSA TOTAL: CPT

## 2023-09-08 ENCOUNTER — TELEPHONE (OUTPATIENT)
Dept: PRIMARY CARE CLINIC | Facility: CLINIC | Age: 63
End: 2023-09-08
Payer: COMMERCIAL

## 2023-09-08 ENCOUNTER — OFFICE VISIT (OUTPATIENT)
Dept: PRIMARY CARE CLINIC | Facility: CLINIC | Age: 63
End: 2023-09-08
Payer: COMMERCIAL

## 2023-09-08 VITALS
HEART RATE: 74 BPM | WEIGHT: 203.19 LBS | SYSTOLIC BLOOD PRESSURE: 124 MMHG | HEIGHT: 72 IN | OXYGEN SATURATION: 97 % | DIASTOLIC BLOOD PRESSURE: 78 MMHG | TEMPERATURE: 98 F | BODY MASS INDEX: 27.52 KG/M2 | RESPIRATION RATE: 18 BRPM

## 2023-09-08 DIAGNOSIS — E78.00 HYPERCHOLESTEROLEMIA WITH LDL GREATER THAN 190 MG/DL: ICD-10-CM

## 2023-09-08 DIAGNOSIS — F51.05 INSOMNIA SECONDARY TO ANXIETY: ICD-10-CM

## 2023-09-08 DIAGNOSIS — I10 ESSENTIAL HYPERTENSION: ICD-10-CM

## 2023-09-08 DIAGNOSIS — M24.849 LOCKING FINGER JOINT: ICD-10-CM

## 2023-09-08 DIAGNOSIS — F41.9 ANXIETY DISORDER, UNSPECIFIED TYPE: ICD-10-CM

## 2023-09-08 DIAGNOSIS — Z00.00 WELLNESS EXAMINATION: Primary | ICD-10-CM

## 2023-09-08 DIAGNOSIS — K42.9 UMBILICAL HERNIA WITHOUT OBSTRUCTION AND WITHOUT GANGRENE: ICD-10-CM

## 2023-09-08 DIAGNOSIS — F41.9 INSOMNIA SECONDARY TO ANXIETY: ICD-10-CM

## 2023-09-08 PROCEDURE — 1160F RVW MEDS BY RX/DR IN RCRD: CPT | Mod: CPTII,,, | Performed by: STUDENT IN AN ORGANIZED HEALTH CARE EDUCATION/TRAINING PROGRAM

## 2023-09-08 PROCEDURE — 3074F SYST BP LT 130 MM HG: CPT | Mod: CPTII,,, | Performed by: STUDENT IN AN ORGANIZED HEALTH CARE EDUCATION/TRAINING PROGRAM

## 2023-09-08 PROCEDURE — 3074F PR MOST RECENT SYSTOLIC BLOOD PRESSURE < 130 MM HG: ICD-10-PCS | Mod: CPTII,,, | Performed by: STUDENT IN AN ORGANIZED HEALTH CARE EDUCATION/TRAINING PROGRAM

## 2023-09-08 PROCEDURE — 1160F PR REVIEW ALL MEDS BY PRESCRIBER/CLIN PHARMACIST DOCUMENTED: ICD-10-PCS | Mod: CPTII,,, | Performed by: STUDENT IN AN ORGANIZED HEALTH CARE EDUCATION/TRAINING PROGRAM

## 2023-09-08 PROCEDURE — 3008F PR BODY MASS INDEX (BMI) DOCUMENTED: ICD-10-PCS | Mod: CPTII,,, | Performed by: STUDENT IN AN ORGANIZED HEALTH CARE EDUCATION/TRAINING PROGRAM

## 2023-09-08 PROCEDURE — 3044F PR MOST RECENT HEMOGLOBIN A1C LEVEL <7.0%: ICD-10-PCS | Mod: CPTII,,, | Performed by: STUDENT IN AN ORGANIZED HEALTH CARE EDUCATION/TRAINING PROGRAM

## 2023-09-08 PROCEDURE — 4010F PR ACE/ARB THEARPY RXD/TAKEN: ICD-10-PCS | Mod: CPTII,,, | Performed by: STUDENT IN AN ORGANIZED HEALTH CARE EDUCATION/TRAINING PROGRAM

## 2023-09-08 PROCEDURE — 1159F MED LIST DOCD IN RCRD: CPT | Mod: CPTII,,, | Performed by: STUDENT IN AN ORGANIZED HEALTH CARE EDUCATION/TRAINING PROGRAM

## 2023-09-08 PROCEDURE — 3044F HG A1C LEVEL LT 7.0%: CPT | Mod: CPTII,,, | Performed by: STUDENT IN AN ORGANIZED HEALTH CARE EDUCATION/TRAINING PROGRAM

## 2023-09-08 PROCEDURE — 1159F PR MEDICATION LIST DOCUMENTED IN MEDICAL RECORD: ICD-10-PCS | Mod: CPTII,,, | Performed by: STUDENT IN AN ORGANIZED HEALTH CARE EDUCATION/TRAINING PROGRAM

## 2023-09-08 PROCEDURE — 3008F BODY MASS INDEX DOCD: CPT | Mod: CPTII,,, | Performed by: STUDENT IN AN ORGANIZED HEALTH CARE EDUCATION/TRAINING PROGRAM

## 2023-09-08 PROCEDURE — 4010F ACE/ARB THERAPY RXD/TAKEN: CPT | Mod: CPTII,,, | Performed by: STUDENT IN AN ORGANIZED HEALTH CARE EDUCATION/TRAINING PROGRAM

## 2023-09-08 PROCEDURE — 99396 PR PREVENTIVE VISIT,EST,40-64: ICD-10-PCS | Mod: ,,, | Performed by: STUDENT IN AN ORGANIZED HEALTH CARE EDUCATION/TRAINING PROGRAM

## 2023-09-08 PROCEDURE — 99396 PREV VISIT EST AGE 40-64: CPT | Mod: ,,, | Performed by: STUDENT IN AN ORGANIZED HEALTH CARE EDUCATION/TRAINING PROGRAM

## 2023-09-08 PROCEDURE — 3078F DIAST BP <80 MM HG: CPT | Mod: CPTII,,, | Performed by: STUDENT IN AN ORGANIZED HEALTH CARE EDUCATION/TRAINING PROGRAM

## 2023-09-08 PROCEDURE — 3078F PR MOST RECENT DIASTOLIC BLOOD PRESSURE < 80 MM HG: ICD-10-PCS | Mod: CPTII,,, | Performed by: STUDENT IN AN ORGANIZED HEALTH CARE EDUCATION/TRAINING PROGRAM

## 2023-09-08 RX ORDER — ATORVASTATIN CALCIUM 40 MG/1
40 TABLET, FILM COATED ORAL NIGHTLY
Qty: 90 TABLET | Refills: 3 | Status: SHIPPED | OUTPATIENT
Start: 2023-09-08

## 2023-09-08 RX ORDER — BUSPIRONE HYDROCHLORIDE 10 MG/1
TABLET ORAL
Qty: 60 TABLET | Refills: 11 | Status: SHIPPED | OUTPATIENT
Start: 2023-09-08

## 2023-09-08 RX ORDER — LOSARTAN POTASSIUM 50 MG/1
50 TABLET ORAL DAILY
Qty: 90 TABLET | Refills: 3 | Status: SHIPPED | OUTPATIENT
Start: 2023-09-08

## 2023-09-08 RX ORDER — TRAZODONE HYDROCHLORIDE 150 MG/1
TABLET ORAL
Qty: 90 TABLET | Refills: 3 | Status: SHIPPED | OUTPATIENT
Start: 2023-09-08

## 2023-09-08 NOTE — ASSESSMENT & PLAN NOTE
Reviewed recent wellness labs. See below for health maintenance    Vaccinations:   - Flu: typically does not receive   - Pneumonia: PCV13 2015. Will give PCV20 at age 65   - Shingles (>50): received in 2019   - Tdap: received 2015   - COVID: did not receive  Screening:   - Prostate (>45): PSA elevated. S/p MRI and biopsy (normal). Follows Urology   - Lung Ca. Screening (50-80 with >20 pack yrs current or quit <15 yrs): n/a   - Colon Ca. Screening (>45): last c-scope 2019 w/Dr. Beauchamp. Pt was told repeat due in 10 yrs. I don't see a copy of this result in our EMR.   - AAA (65-75 if ever smoked):    - Cardiac: Ca score moderate at 227. Discussed Cards referral, pt declines, wants to repeat Ca score which we can do at 3 years (2025).

## 2023-09-08 NOTE — ASSESSMENT & PLAN NOTE
R 5th digit. Mild at this point. Discussed PT vs Ortho referral. Pt declines at this time. Will notify MD if worsening.

## 2023-09-08 NOTE — TELEPHONE ENCOUNTER
----- Message from Brian Amado MD sent at 9/8/2023 11:28 AM CDT -----  I don't see a colonoscopy record for him. I see that it was done with [facility] in 2019 but no results in our system. Can we request copy of this?    Also, looks like he got shingles vaccine x 2 but the system still shows this as a care gap. Please get this corrected. 2 vaccines completes the series.    Thanks  Brian Amado MD

## 2023-09-08 NOTE — PROGRESS NOTES
ANNUAL WELLNESS NOTE    Chief Complaint:  Annual Exam (With labs)     HPI:  Vamsi Ratliff is a 63 y.o. male    ----------------------------  Acute sinusitis      Comment:  hx  Anxiety  Arthritis  Essential (primary) hypertension  Insomnia  Low testosterone in male  Male erectile dysfunction, unspecified  Mixed hyperlipidemia    Patient Care Team:  Brian Amado MD as PCP - General (Internal Medicine)  Gregg Beauchamp MD as Consulting Physician (Gastroenterology)    Presents today for wellness visit. Describes overall health as good. Diet is healthy overall. Exercises 3-4 times per week combo of walking, weights, body weight exercises. Tobacco use: chewing tobacco on occasion. Alcohol use: usually 10-12 beers on weekend. Caffeine intake: 2-3 caffeinated drinks daily. Eye exam: annually (wears contacts). Dental exam: overdue.    Review of Systems   Constitutional:  Negative for chills and fever.   HENT:  Negative for sinus pressure/congestion and sore throat.    Respiratory:  Negative for cough, shortness of breath and wheezing.    Cardiovascular:  Negative for chest pain and leg swelling.   Gastrointestinal:  Negative for abdominal pain, nausea and vomiting.   Genitourinary:  Negative for dysuria and hematuria.   Musculoskeletal:  Negative for arthralgias and myalgias.   Integumentary:  Negative for rash.   Neurological:  Negative for dizziness and syncope.   Hematological:  Negative for adenopathy.   Psychiatric/Behavioral:  Negative for confusion. The patient is not nervous/anxious.        Physical Exam  Vitals and nursing note reviewed.   Constitutional:       General: He is not in acute distress.  HENT:      Head: Normocephalic.      Nose: No rhinorrhea.   Eyes:      Conjunctiva/sclera: Conjunctivae normal.      Pupils: Pupils are equal, round, and reactive to light.   Cardiovascular:      Rate and Rhythm: Normal rate and regular rhythm.   Pulmonary:      Effort: Pulmonary effort is normal.      Breath  sounds: Normal breath sounds.   Abdominal:      Palpations: Abdomen is soft.      Tenderness: There is no abdominal tenderness.      Hernia: A hernia (1.5 cm umbilical, reducible) is present.   Musculoskeletal:         General: No deformity or signs of injury.   Skin:     General: Skin is warm and dry.   Neurological:      General: No focal deficit present.      Mental Status: He is alert. Mental status is at baseline.   Psychiatric:         Mood and Affect: Mood normal.         Behavior: Behavior normal.       Assessment/Plan:  1. Wellness examination  Assessment & Plan:  Reviewed recent wellness labs. See below for health maintenance    Vaccinations:   - Flu: typically does not receive   - Pneumonia: PCV13 2015. Will give PCV20 at age 65   - Shingles (>50): received in 2019   - Tdap: received 2015   - COVID: did not receive  Screening:   - Prostate (>45): PSA elevated. S/p MRI and biopsy (normal). Follows Urology   - Lung Ca. Screening (50-80 with >20 pack yrs current or quit <15 yrs): n/a   - Colon Ca. Screening (>45): last c-scope 2019 w/Dr. Beauchamp. Pt was told repeat due in 10 yrs. I don't see a copy of this result in our EMR.   - AAA (65-75 if ever smoked):    - Cardiac: Ca score moderate at 227. Discussed Cards referral, pt declines, wants to repeat Ca score which we can do at 3 years (2025).      2. Umbilical hernia without obstruction and without gangrene  Assessment & Plan:  Pt requests surgery eval    Orders:  -     Ambulatory referral/consult to General Surgery    3. Locking finger joint  Assessment & Plan:  R 5th digit. Mild at this point. Discussed PT vs Ortho referral. Pt declines at this time. Will notify MD if worsening.        Follow up in about 1 year (around 9/8/2024) for Wellness.

## 2023-09-08 NOTE — LETTER
AUTHORIZATION FOR RELEASE OF   CONFIDENTIAL INFORMATION    Dear Quynh Webb,    We are seeing Vamsi Ratliff, date of birth 1960, in the clinic at [facility]. Brian Amado MD is the patient's PCP. Vamsi Ratliff has an outstanding lab/procedure at the time we reviewed his chart. In order to help keep his health information updated, he has authorized us to request the following medical record(s):        (  )  MAMMOGRAM                                      ( /tra)  COLONOSCOPY      (  )  PAP SMEAR                                          (  )  OUTSIDE LAB RESULTS     (  )  DEXA SCAN                                          (  )  EYE EXAM            (  )  FOOT EXAM                                          (  )  ENTIRE RECORD     (  )  OUTSIDE IMMUNIZATIONS                 (  )  _______________         Please fax records to [facility], [place], Jeffrey D, MD, at [phone]           Patient Name: Vamsi Ratliff  : 1960  Patient Phone #: [phone]

## 2023-10-04 ENCOUNTER — OFFICE VISIT (OUTPATIENT)
Dept: SURGERY | Facility: CLINIC | Age: 63
End: 2023-10-04
Payer: COMMERCIAL

## 2023-10-04 VITALS
HEART RATE: 74 BPM | HEIGHT: 72 IN | DIASTOLIC BLOOD PRESSURE: 88 MMHG | WEIGHT: 199.81 LBS | BODY MASS INDEX: 27.06 KG/M2 | SYSTOLIC BLOOD PRESSURE: 144 MMHG

## 2023-10-04 DIAGNOSIS — K43.9 VENTRAL HERNIA WITHOUT OBSTRUCTION OR GANGRENE: Primary | ICD-10-CM

## 2023-10-04 DIAGNOSIS — Z01.818 PREOP EXAMINATION: ICD-10-CM

## 2023-10-04 PROCEDURE — 1160F RVW MEDS BY RX/DR IN RCRD: CPT | Mod: CPTII,,, | Performed by: SURGERY

## 2023-10-04 PROCEDURE — 1160F PR REVIEW ALL MEDS BY PRESCRIBER/CLIN PHARMACIST DOCUMENTED: ICD-10-PCS | Mod: CPTII,,, | Performed by: SURGERY

## 2023-10-04 PROCEDURE — 4010F PR ACE/ARB THEARPY RXD/TAKEN: ICD-10-PCS | Mod: CPTII,,, | Performed by: SURGERY

## 2023-10-04 PROCEDURE — 3044F HG A1C LEVEL LT 7.0%: CPT | Mod: CPTII,,, | Performed by: SURGERY

## 2023-10-04 PROCEDURE — 1159F MED LIST DOCD IN RCRD: CPT | Mod: CPTII,,, | Performed by: SURGERY

## 2023-10-04 PROCEDURE — 1159F PR MEDICATION LIST DOCUMENTED IN MEDICAL RECORD: ICD-10-PCS | Mod: CPTII,,, | Performed by: SURGERY

## 2023-10-04 PROCEDURE — 3079F PR MOST RECENT DIASTOLIC BLOOD PRESSURE 80-89 MM HG: ICD-10-PCS | Mod: CPTII,,, | Performed by: SURGERY

## 2023-10-04 PROCEDURE — 3008F BODY MASS INDEX DOCD: CPT | Mod: CPTII,,, | Performed by: SURGERY

## 2023-10-04 PROCEDURE — 4010F ACE/ARB THERAPY RXD/TAKEN: CPT | Mod: CPTII,,, | Performed by: SURGERY

## 2023-10-04 PROCEDURE — 3044F PR MOST RECENT HEMOGLOBIN A1C LEVEL <7.0%: ICD-10-PCS | Mod: CPTII,,, | Performed by: SURGERY

## 2023-10-04 PROCEDURE — 3008F PR BODY MASS INDEX (BMI) DOCUMENTED: ICD-10-PCS | Mod: CPTII,,, | Performed by: SURGERY

## 2023-10-04 PROCEDURE — 3079F DIAST BP 80-89 MM HG: CPT | Mod: CPTII,,, | Performed by: SURGERY

## 2023-10-04 PROCEDURE — 99204 PR OFFICE/OUTPT VISIT, NEW, LEVL IV, 45-59 MIN: ICD-10-PCS | Mod: ,,, | Performed by: SURGERY

## 2023-10-04 PROCEDURE — 3077F PR MOST RECENT SYSTOLIC BLOOD PRESSURE >= 140 MM HG: ICD-10-PCS | Mod: CPTII,,, | Performed by: SURGERY

## 2023-10-04 PROCEDURE — 3077F SYST BP >= 140 MM HG: CPT | Mod: CPTII,,, | Performed by: SURGERY

## 2023-10-04 PROCEDURE — 99204 OFFICE O/P NEW MOD 45 MIN: CPT | Mod: ,,, | Performed by: SURGERY

## 2023-10-04 NOTE — PROGRESS NOTES
HISTORY & PHYSICAL  General Surgery    Patient Name: Vamsi Ratliff  YOB: 1960    Date: 10/04/2023                     SUBJECTIVE:     Chief Complaint/Reason for Admission: Consult (Umbilical hernia )       History of Present Illness:  Mr. Vamsi Ratliff is a 63 y.o. male with a history of strenuous activity and reports he noticed abdominal bulge that has been present for quite some time. He states bulge has become larger over time and is now causing some discomfort. He denies changes in bladder or bowel habits or nausea/vomiting. He denies chest pain, shortness of breath, fever or chills.     Review of Systems:  12 point ROS negative except as stated in HPI    PAST HISTORY:     Past Medical History:   Diagnosis Date    Acute sinusitis     hx    Anxiety     Arthritis     Essential (primary) hypertension     Insomnia     Low testosterone in male     Male erectile dysfunction, unspecified     Mixed hyperlipidemia      Past Surgical History:   Procedure Laterality Date    COLONOSCOPY      VASECTOMY       Family History   Problem Relation Age of Onset    Cancer Mother         thyroid ca    Cancer Father         prostate    Diabetes Father     Hypertension Father     Liver disease Father      Social History     Socioeconomic History    Marital status:    Tobacco Use    Smoking status: Never    Smokeless tobacco: Current     Types: Snuff    Tobacco comments:     dips @ times   Substance and Sexual Activity    Alcohol use: Yes     Alcohol/week: 12.0 standard drinks of alcohol     Types: 12 Cans of beer per week     Comment: 12 pk on weekends only    Drug use: Never    Sexual activity: Yes     Partners: Female     Social Determinants of Health     Financial Resource Strain: Low Risk  (9/8/2023)    Overall Financial Resource Strain (CARDIA)     Difficulty of Paying Living Expenses: Not hard at all   Food Insecurity: No Food Insecurity (9/8/2023)    Hunger Vital Sign     Worried About Running Out of Food in  the Last Year: Never true     Ran Out of Food in the Last Year: Never true   Transportation Needs: No Transportation Needs (9/8/2023)    PRAPARE - Transportation     Lack of Transportation (Medical): No     Lack of Transportation (Non-Medical): No   Physical Activity: Insufficiently Active (9/8/2023)    Exercise Vital Sign     Days of Exercise per Week: 4 days     Minutes of Exercise per Session: 30 min   Stress: No Stress Concern Present (9/8/2023)    [place] [employer] - Occupational Stress Questionnaire     Feeling of Stress : Not at all   Social Connections: Moderately Isolated (9/8/2023)    Social Connection and Isolation Panel [NHANES]     Frequency of Communication with Friends and Family: More than three times a week     Frequency of Social Gatherings with Friends and Family: More than three times a week     Attends Islam Services: Never     Active Member of Clubs or Organizations: No     Attends Club or Organization Meetings: Never     Marital Status:    Housing Stability: Low Risk  (9/8/2023)    Housing Stability Vital Sign     Unable to Pay for Housing in the Last Year: No     Number of Places Lived in the Last Year: 1     Unstable Housing in the Last Year: No       MEDICATIONS & ALLERGIES:     Current Outpatient Medications on File Prior to Visit   Medication Sig    atorvastatin (LIPITOR) 40 MG tablet Take 1 tablet (40 mg total) by mouth every evening.    busPIRone (BUSPAR) 10 MG tablet TAKE 1 TABLET(10 MG) BY MOUTH TWICE DAILY AS NEEDED FOR ANXIETY    losartan (COZAAR) 50 MG tablet Take 1 tablet (50 mg total) by mouth once daily.    tamsulosin (FLOMAX) 0.4 mg Cap Take 1 capsule by mouth every evening.    traZODone (DESYREL) 150 MG tablet TAKE 1 TABLET(150 MG) BY MOUTH EVERY NIGHT 1 HOUR BEFORE BEDTIME AS NEEDED FOR INSOMNIA    diclofenac sodium (VOLTAREN) 1 % Gel Apply 2 g topically 4 (four) times daily as needed (Arthritis pain). (Patient not taking: Reported on  10/4/2023)     No current facility-administered medications on file prior to visit.     Review of patient's allergies indicates:  No Known Allergies      OBJECTIVE:   Visit Vitals  BP (!) 144/88   Pulse 74   Ht 6' (1.829 m)   Wt 90.6 kg (199 lb 12.8 oz)   BMI 27.10 kg/m²       Physical Exam:  General:  Well developed, well nourished, no acute distress  HEENT:  Normocephalic, atraumatic, PERRL, EOMI, clear sclera, ears normal, neck supple, throat clear without erythema or exudates  CVS:  RRR, S1 and S2 normal, no murmurs, rubs, gallops  Resp:  Lungs clear to auscultation, no wheezes, rales, rhonchi, cough  GI:  Abdomen soft, non-tender, non-distended, normoactive bowel sounds, no masses.   + reducible ventral hernia, NTTP  :  Deferred  MSK:  No muscle atrophy, cyanosis, peripheral edema, full range of motion  Skin:  No rashes, ulcers, erythema  Neuro:  CNII-XII grossly intact  Psych:  Alert and oriented to person, place, and time    Results:  I have independently reviewed all pertinent labs and radiologic studies (CT) relevant to general/bariatric surgery.    VISIT DIAGNOSES:       ICD-10-CM ICD-9-CM   1. Ventral hernia without obstruction or gangrene  K43.9 553.20   2. Preop examination  Z01.818 V72.84        ASSESSMENT/PLAN:     62 yo male with Ventral Hernia (Chronic  with exacerbation of pain)    -  Risks / Benefits discussed with the patient who voiced understanding and wishes to proceed with surgery   - Open Ventral Hernia Repair    - Preoperative workup as ordered

## 2023-10-11 ENCOUNTER — TELEPHONE (OUTPATIENT)
Dept: SURGERY | Facility: CLINIC | Age: 63
End: 2023-10-11
Payer: COMMERCIAL

## 2023-10-31 ENCOUNTER — PATIENT MESSAGE (OUTPATIENT)
Dept: ADMINISTRATIVE | Facility: OTHER | Age: 63
End: 2023-10-31
Payer: COMMERCIAL

## 2023-11-01 ENCOUNTER — PATIENT MESSAGE (OUTPATIENT)
Dept: ADMINISTRATIVE | Facility: OTHER | Age: 63
End: 2023-11-01
Payer: COMMERCIAL

## 2023-11-02 ENCOUNTER — HOSPITAL ENCOUNTER (OUTPATIENT)
Dept: RADIOLOGY | Facility: HOSPITAL | Age: 63
Discharge: HOME | End: 2023-11-02
Attending: SURGERY
Payer: COMMERCIAL

## 2023-11-02 DIAGNOSIS — Z01.818 PREOP EXAMINATION: ICD-10-CM

## 2023-11-02 PROBLEM — K43.9 VENTRAL HERNIA WITHOUT OBSTRUCTION OR GANGRENE: Status: ACTIVE | Noted: 2022-06-22

## 2023-11-02 PROCEDURE — 71045 X-RAY EXAM CHEST 1 VIEW: CPT | Mod: TC

## 2023-11-15 ENCOUNTER — OFFICE VISIT (OUTPATIENT)
Dept: SURGERY | Facility: CLINIC | Age: 63
End: 2023-11-15
Payer: COMMERCIAL

## 2023-11-15 VITALS
SYSTOLIC BLOOD PRESSURE: 146 MMHG | HEART RATE: 76 BPM | BODY MASS INDEX: 26.95 KG/M2 | HEIGHT: 72 IN | DIASTOLIC BLOOD PRESSURE: 82 MMHG | WEIGHT: 199 LBS

## 2023-11-15 DIAGNOSIS — Z98.890 POST-OPERATIVE STATE: Primary | ICD-10-CM

## 2023-11-15 PROCEDURE — 1159F MED LIST DOCD IN RCRD: CPT | Mod: CPTII,,,

## 2023-11-15 PROCEDURE — 4010F ACE/ARB THERAPY RXD/TAKEN: CPT | Mod: CPTII,,,

## 2023-11-15 PROCEDURE — 3044F PR MOST RECENT HEMOGLOBIN A1C LEVEL <7.0%: ICD-10-PCS | Mod: CPTII,,,

## 2023-11-15 PROCEDURE — 3044F HG A1C LEVEL LT 7.0%: CPT | Mod: CPTII,,,

## 2023-11-15 PROCEDURE — 1159F PR MEDICATION LIST DOCUMENTED IN MEDICAL RECORD: ICD-10-PCS | Mod: CPTII,,,

## 2023-11-15 PROCEDURE — 1160F PR REVIEW ALL MEDS BY PRESCRIBER/CLIN PHARMACIST DOCUMENTED: ICD-10-PCS | Mod: CPTII,,,

## 2023-11-15 PROCEDURE — 99024 POSTOP FOLLOW-UP VISIT: CPT | Mod: ,,,

## 2023-11-15 PROCEDURE — 1160F RVW MEDS BY RX/DR IN RCRD: CPT | Mod: CPTII,,,

## 2023-11-15 PROCEDURE — 4010F PR ACE/ARB THEARPY RXD/TAKEN: ICD-10-PCS | Mod: CPTII,,,

## 2023-11-15 PROCEDURE — 3079F DIAST BP 80-89 MM HG: CPT | Mod: CPTII,,,

## 2023-11-15 PROCEDURE — 99024 PR POST-OP FOLLOW-UP VISIT: ICD-10-PCS | Mod: ,,,

## 2023-11-15 PROCEDURE — 3079F PR MOST RECENT DIASTOLIC BLOOD PRESSURE 80-89 MM HG: ICD-10-PCS | Mod: CPTII,,,

## 2023-11-15 PROCEDURE — 3077F SYST BP >= 140 MM HG: CPT | Mod: CPTII,,,

## 2023-11-15 PROCEDURE — 3077F PR MOST RECENT SYSTOLIC BLOOD PRESSURE >= 140 MM HG: ICD-10-PCS | Mod: CPTII,,,

## 2023-11-15 NOTE — PROGRESS NOTES
Post Operative Progress Note  General Surgery    Patient Name: Vamsi Ratliff  YOB: 1960    Date: 11/15/2023                   SUBJECTIVE:     Chief Complaint/Reason for Admission:   Chief Complaint   Patient presents with    Post-op Evaluation     Open umbilical hernia repair 11/2/23        History of Present Illness:  Mr. Vamsi Ratliff is a 63 y.o. male s/p ventral (umbilical) hernia repair. He is without any complaints. Tolerating a regular diet without changes to bowel / bladder habits. Denies fever / chills.    Review of Systems:  12 point ROS negative except as stated in HPI    PAST HISTORY:     Past Medical History:   Diagnosis Date    Acute sinusitis     hx    Anxiety     Arthritis     Essential (primary) hypertension     Insomnia     Low testosterone in male     Male erectile dysfunction, unspecified     Mixed hyperlipidemia     ANA on CPAP      Past Surgical History:   Procedure Laterality Date    COLONOSCOPY      PROSTATE BIOPSY      REPAIR, HERNIA, VENTRAL N/A 11/2/2023    Procedure: REPAIR, HERNIA, VENTRAL;  Surgeon: Kuamr Brower Jr., MD;  [facility];  Service: General;  Laterality: N/A;  open ventral hernia repair    VASECTOMY       Family History   Problem Relation Age of Onset    Cancer Mother         Thyroid    Cancer Father         Prostate    Diabetes Father     Hypertension Father     Liver disease Father      Social History     Socioeconomic History    Marital status:    Tobacco Use    Smoking status: Never    Smokeless tobacco: Current     Types: Snuff    Tobacco comments:     One dip of [place] 3 or 4 times a week   Substance and Sexual Activity    Alcohol use: Yes     Alcohol/week: 12.0 standard drinks of alcohol     Types: 12 Cans of beer per week     Comment: Weekends    Drug use: Never    Sexual activity: Yes     Partners: Female     Social Determinants of Health     Financial Resource Strain: Low Risk  (9/8/2023)    Overall Financial Resource Strain (CARDIA)      Difficulty of Paying Living Expenses: Not hard at all   Food Insecurity: No Food Insecurity (9/8/2023)    Hunger Vital Sign     Worried About Running Out of Food in the Last Year: Never true     Ran Out of Food in the Last Year: Never true   Transportation Needs: No Transportation Needs (9/8/2023)    PRAPARE - Transportation     Lack of Transportation (Medical): No     Lack of Transportation (Non-Medical): No   Physical Activity: Insufficiently Active (9/8/2023)    Exercise Vital Sign     Days of Exercise per Week: 4 days     Minutes of Exercise per Session: 30 min   Stress: No Stress Concern Present (9/8/2023)    [place] [employer]ational [employer] - Occupational Stress Questionnaire     Feeling of Stress : Not at all   Social Connections: Moderately Isolated (9/8/2023)    Social Connection and Isolation Panel [NHANES]     Frequency of Communication with Friends and Family: More than three times a week     Frequency of Social Gatherings with Friends and Family: More than three times a week     Attends Scientology Services: Never     Active Member of Clubs or Organizations: No     Attends Club or Organization Meetings: Never     Marital Status:    Housing Stability: Low Risk  (9/8/2023)    Housing Stability Vital Sign     Unable to Pay for Housing in the Last Year: No     Number of Places Lived in the Last Year: 1     Unstable Housing in the Last Year: No       MEDICATIONS & ALLERGIES:     Current Outpatient Medications on File Prior to Visit   Medication Sig    atorvastatin (LIPITOR) 40 MG tablet Take 1 tablet (40 mg total) by mouth every evening.    busPIRone (BUSPAR) 10 MG tablet TAKE 1 TABLET(10 MG) BY MOUTH TWICE DAILY AS NEEDED FOR ANXIETY    diclofenac sodium (VOLTAREN) 1 % Gel Apply 2 g topically 4 (four) times daily as needed (Arthritis pain). (Patient not taking: Reported on 10/4/2023)    HYDROcodone-acetaminophen (NORCO) 5-325 mg per tablet Take 1 tablet by mouth every 6 (six) hours as  needed for Pain.    losartan (COZAAR) 50 MG tablet Take 1 tablet (50 mg total) by mouth once daily.    tamsulosin (FLOMAX) 0.4 mg Cap Take 1 capsule by mouth every evening.    traZODone (DESYREL) 150 MG tablet TAKE 1 TABLET(150 MG) BY MOUTH EVERY NIGHT 1 HOUR BEFORE BEDTIME AS NEEDED FOR INSOMNIA     No current facility-administered medications on file prior to visit.     Review of patient's allergies indicates:  No Known Allergies    OBJECTIVE:     Vitals:    11/15/23 1057   BP: (!) 146/82   Pulse: 76   Weight: 90.3 kg (199 lb)   Height: 6' (1.829 m)     Body mass index is 26.99 kg/m².    Physical Exam:  General:  Well developed, well nourished, no acute distress  HEENT:  Normocephalic, atraumatic, PERRL, EOMI, clear sclera, ears normal, neck supple, throat clear without erythema or exudates  CVS:  RRR, S1 and S2 normal, no murmurs, rubs, gallops  Resp:  Lungs clear to auscultation, no wheezes, rales, rhonchi, cough  GI:  Abdomen soft, non-tender, non-distended, normoactive bowel sounds, no masses  :  Deferred  MSK:  No muscle atrophy, cyanosis, peripheral edema, full range of motion  Skin:  No rashes, ulcers, erythema. Incision c/d/i  Neuro:  CNII-XII grossly intact  Psych:  Alert and oriented to person, place, and time        VISIT DIAGNOSES:       ICD-10-CM ICD-9-CM   1. Post-operative state  Z98.890 V45.89       ASSESSMENT/PLAN:     Mr. Vamsi Ratliff is a 63 y.o. male s/p ventral (umbilical) hernia repair    - doing well  - no heavy lifting >20lbs x 3 additional weeks    RTC PRN

## 2023-12-11 PROBLEM — Z00.00 WELLNESS EXAMINATION: Status: RESOLVED | Noted: 2023-09-08 | Resolved: 2023-12-11

## 2023-12-28 ENCOUNTER — TELEPHONE (OUTPATIENT)
Dept: ADMINISTRATIVE | Facility: HOSPITAL | Age: 63
End: 2023-12-28
Payer: COMMERCIAL

## 2023-12-28 VITALS — SYSTOLIC BLOOD PRESSURE: 120 MMHG | DIASTOLIC BLOOD PRESSURE: 80 MMHG

## 2024-01-08 ENCOUNTER — OFFICE VISIT (OUTPATIENT)
Dept: PRIMARY CARE CLINIC | Facility: CLINIC | Age: 64
End: 2024-01-08
Payer: COMMERCIAL

## 2024-01-08 VITALS
HEIGHT: 72 IN | DIASTOLIC BLOOD PRESSURE: 82 MMHG | WEIGHT: 200.4 LBS | SYSTOLIC BLOOD PRESSURE: 140 MMHG | BODY MASS INDEX: 27.14 KG/M2 | TEMPERATURE: 97.3 F | OXYGEN SATURATION: 97 % | RESPIRATION RATE: 18 BRPM | HEART RATE: 88 BPM

## 2024-01-08 DIAGNOSIS — R97.20 ELEVATED PSA: ICD-10-CM

## 2024-01-08 DIAGNOSIS — Z13.1 SCREENING FOR DIABETES MELLITUS: ICD-10-CM

## 2024-01-08 DIAGNOSIS — Z97.3 WEARS CONTACT LENSES: ICD-10-CM

## 2024-01-08 DIAGNOSIS — Z00.00 ANNUAL PHYSICAL EXAM: ICD-10-CM

## 2024-01-08 DIAGNOSIS — Z56.6 STRESS AT WORK: ICD-10-CM

## 2024-01-08 DIAGNOSIS — Z80.8 FAMILY HISTORY OF SKIN CANCER: ICD-10-CM

## 2024-01-08 DIAGNOSIS — E78.5 HYPERLIPIDEMIA, UNSPECIFIED HYPERLIPIDEMIA TYPE: ICD-10-CM

## 2024-01-08 DIAGNOSIS — G47.00 INSOMNIA, UNSPECIFIED TYPE: ICD-10-CM

## 2024-01-08 DIAGNOSIS — Z76.89 ENCOUNTER TO ESTABLISH CARE WITH NEW DOCTOR: Primary | ICD-10-CM

## 2024-01-08 DIAGNOSIS — I10 HYPERTENSION, BENIGN: ICD-10-CM

## 2024-01-08 DIAGNOSIS — R35.0 URINARY FREQUENCY: ICD-10-CM

## 2024-01-08 PROCEDURE — 3077F SYST BP >= 140 MM HG: CPT | Performed by: FAMILY MEDICINE

## 2024-01-08 PROCEDURE — 99386 PREV VISIT NEW AGE 40-64: CPT | Performed by: FAMILY MEDICINE

## 2024-01-08 PROCEDURE — 3079F DIAST BP 80-89 MM HG: CPT | Performed by: FAMILY MEDICINE

## 2024-01-08 RX ORDER — TRAZODONE HYDROCHLORIDE 150 MG/1
TABLET ORAL
COMMUNITY

## 2024-01-08 RX ORDER — TAMSULOSIN HYDROCHLORIDE 0.4 MG/1
CAPSULE ORAL
COMMUNITY

## 2024-01-08 RX ORDER — ATORVASTATIN CALCIUM 40 MG/1
TABLET, FILM COATED ORAL
COMMUNITY

## 2024-01-08 RX ORDER — BUSPIRONE HYDROCHLORIDE 10 MG/1
TABLET ORAL
COMMUNITY

## 2024-01-08 RX ORDER — LOSARTAN POTASSIUM 50 MG/1
TABLET ORAL
COMMUNITY

## 2024-01-08 SDOH — ECONOMIC STABILITY: FOOD INSECURITY: WITHIN THE PAST 12 MONTHS, THE FOOD YOU BOUGHT JUST DIDN'T LAST AND YOU DIDN'T HAVE MONEY TO GET MORE.: NEVER TRUE

## 2024-01-08 SDOH — ECONOMIC STABILITY: INCOME INSECURITY: HOW HARD IS IT FOR YOU TO PAY FOR THE VERY BASICS LIKE FOOD, HOUSING, MEDICAL CARE, AND HEATING?: NOT HARD AT ALL

## 2024-01-08 SDOH — ECONOMIC STABILITY: HOUSING INSECURITY
IN THE LAST 12 MONTHS, WAS THERE A TIME WHEN YOU DID NOT HAVE A STEADY PLACE TO SLEEP OR SLEPT IN A SHELTER (INCLUDING NOW)?: NO

## 2024-01-08 SDOH — ECONOMIC STABILITY: FOOD INSECURITY: WITHIN THE PAST 12 MONTHS, YOU WORRIED THAT YOUR FOOD WOULD RUN OUT BEFORE YOU GOT MONEY TO BUY MORE.: NEVER TRUE

## 2024-01-08 SDOH — HEALTH STABILITY - MENTAL HEALTH: OTHER PHYSICAL AND MENTAL STRAIN RELATED TO WORK: Z56.6

## 2024-01-08 ASSESSMENT — PATIENT HEALTH QUESTIONNAIRE - PHQ9
1. LITTLE INTEREST OR PLEASURE IN DOING THINGS: 0
SUM OF ALL RESPONSES TO PHQ QUESTIONS 1-9: 0
DEPRESSION UNABLE TO ASSESS: FUNCTIONAL CAPACITY MOTIVATION LIMITS ACCURACY
SUM OF ALL RESPONSES TO PHQ9 QUESTIONS 1 & 2: 0
2. FEELING DOWN, DEPRESSED OR HOPELESS: 0

## 2024-01-08 ASSESSMENT — ANXIETY QUESTIONNAIRES
7. FEELING AFRAID AS IF SOMETHING AWFUL MIGHT HAPPEN: 1
4. TROUBLE RELAXING: 1
GAD7 TOTAL SCORE: 7
5. BEING SO RESTLESS THAT IT IS HARD TO SIT STILL: 1
1. FEELING NERVOUS, ANXIOUS, OR ON EDGE: 1
3. WORRYING TOO MUCH ABOUT DIFFERENT THINGS: 1
IF YOU CHECKED OFF ANY PROBLEMS ON THIS QUESTIONNAIRE, HOW DIFFICULT HAVE THESE PROBLEMS MADE IT FOR YOU TO DO YOUR WORK, TAKE CARE OF THINGS AT HOME, OR GET ALONG WITH OTHER PEOPLE: SOMEWHAT DIFFICULT
6. BECOMING EASILY ANNOYED OR IRRITABLE: 1
2. NOT BEING ABLE TO STOP OR CONTROL WORRYING: 1

## 2024-01-08 ASSESSMENT — ENCOUNTER SYMPTOMS
SHORTNESS OF BREATH: 0
COUGH: 0
VOMITING: 0
ABDOMINAL PAIN: 0
DIARRHEA: 0
NAUSEA: 0

## 2024-01-08 NOTE — PATIENT INSTRUCTIONS
IT WAS GREAT TO SEE YOU TODAY!    I WILL CALL YOU WITH THE RESULTS OF YOUR LABS.    PLEASE TAKE ALL MEDICATION AS DISCUSSED.    I WILL SEE YOU AGAIN IN 6 MONTHS BUT PLEASE CALL WITH CONCERNS [phone]    YOU CAN TRY CALLING MY DENTIST, DR. GARCIA DUPONT  [address] [place] [id number]   [email or web address]   [phone]

## 2024-01-08 NOTE — ASSESSMENT & PLAN NOTE
Previous PCP put him on buspirone to help with this, patient did not think it helped too much but just started a new job.  Plan to see if it works and will recheck in 6 months.

## 2024-01-08 NOTE — ASSESSMENT & PLAN NOTE
History of an elevated PSA with negative prostate MRI and biopsies.  Taking Flomax.  Father has history of prostate cancer.  Will have him return for repeat PSA in 6 months.  Consider urology referral if symptoms develop.

## 2024-01-08 NOTE — ASSESSMENT & PLAN NOTE
Doing well on atorvastatin, does not need a refill.  Will check lipids as part of his work physical requirements

## 2024-01-08 NOTE — PROGRESS NOTES
Abdomen is soft.      Tenderness: There is no abdominal tenderness. There is no right CVA tenderness or left CVA tenderness.   Musculoskeletal:         General: Normal range of motion.      Cervical back: Normal range of motion.   Skin:     General: Skin is warm.   Neurological:      General: No focal deficit present.      Mental Status: He is alert.   Psychiatric:         Mood and Affect: Mood normal.         Behavior: Behavior normal.             Mylene Bellamy DO  5:15 PM  01/08/24

## 2024-01-08 NOTE — ASSESSMENT & PLAN NOTE
Controlled on losartan, patient reports today's blood pressure reading is out of character for him.  Will continue current regimen and recheck in 6 months.

## 2024-01-12 DIAGNOSIS — Z00.00 ANNUAL PHYSICAL EXAM: ICD-10-CM

## 2024-01-12 DIAGNOSIS — E78.5 HYPERLIPIDEMIA, UNSPECIFIED HYPERLIPIDEMIA TYPE: ICD-10-CM

## 2024-01-12 DIAGNOSIS — Z13.1 SCREENING FOR DIABETES MELLITUS: ICD-10-CM

## 2024-01-12 LAB
CHOLEST SERPL-MCNC: 185 MG/DL
HDLC SERPL-MCNC: 64 MG/DL (ref 40–60)
HDLC SERPL: 2.9
LDLC SERPL CALC-MCNC: 95.6 MG/DL
TRIGL SERPL-MCNC: 127 MG/DL (ref 35–150)
VLDLC SERPL CALC-MCNC: 25.4 MG/DL (ref 6–23)

## 2024-01-13 LAB
EST. AVERAGE GLUCOSE BLD GHB EST-MCNC: 111 MG/DL
HBA1C MFR BLD: 5.5 % (ref 4.8–5.6)

## 2024-02-07 PROBLEM — Z00.00 ANNUAL PHYSICAL EXAM: Status: RESOLVED | Noted: 2024-01-08 | Resolved: 2024-02-07

## 2024-02-09 ENCOUNTER — APPOINTMENT (RX ONLY)
Dept: URBAN - METROPOLITAN AREA CLINIC 329 | Facility: CLINIC | Age: 64
Setting detail: DERMATOLOGY
End: 2024-02-09

## 2024-02-09 DIAGNOSIS — L85.3 XEROSIS CUTIS: ICD-10-CM

## 2024-02-09 DIAGNOSIS — D18.0 HEMANGIOMA: ICD-10-CM

## 2024-02-09 DIAGNOSIS — L82.1 OTHER SEBORRHEIC KERATOSIS: ICD-10-CM

## 2024-02-09 DIAGNOSIS — L20.89 OTHER ATOPIC DERMATITIS: ICD-10-CM | Status: INADEQUATELY CONTROLLED

## 2024-02-09 DIAGNOSIS — D22 MELANOCYTIC NEVI: ICD-10-CM

## 2024-02-09 DIAGNOSIS — L81.4 OTHER MELANIN HYPERPIGMENTATION: ICD-10-CM

## 2024-02-09 PROBLEM — D18.01 HEMANGIOMA OF SKIN AND SUBCUTANEOUS TISSUE: Status: ACTIVE | Noted: 2024-02-09

## 2024-02-09 PROBLEM — D22.5 MELANOCYTIC NEVI OF TRUNK: Status: ACTIVE | Noted: 2024-02-09

## 2024-02-09 PROCEDURE — ? PRESCRIPTION

## 2024-02-09 PROCEDURE — ? PRESCRIPTION MEDICATION MANAGEMENT

## 2024-02-09 PROCEDURE — ? COUNSELING

## 2024-02-09 PROCEDURE — ? REFERRAL CORRESPONDENCE

## 2024-02-09 PROCEDURE — 99204 OFFICE O/P NEW MOD 45 MIN: CPT

## 2024-02-09 PROCEDURE — ? SUNSCREEN RECOMMENDATIONS

## 2024-02-09 RX ORDER — TRIAMCINOLONE ACETONIDE 1 MG/G
CREAM TOPICAL
Qty: 45 | Refills: 2 | Status: ERX | COMMUNITY
Start: 2024-02-09

## 2024-02-09 RX ADMIN — TRIAMCINOLONE ACETONIDE: 1 CREAM TOPICAL at 00:00

## 2024-02-09 ASSESSMENT — LOCATION DETAILED DESCRIPTION DERM
LOCATION DETAILED: RIGHT SUPERIOR MEDIAL UPPER BACK
LOCATION DETAILED: EPIGASTRIC SKIN
LOCATION DETAILED: INFERIOR THORACIC SPINE
LOCATION DETAILED: LEFT LATERAL DISTAL PRETIBIAL REGION
LOCATION DETAILED: RIGHT DISTAL CALF
LOCATION DETAILED: RIGHT MEDIAL BREAST 1-2:00 REGION
LOCATION DETAILED: SUPERIOR THORACIC SPINE
LOCATION DETAILED: RIGHT SUPERIOR UPPER BACK

## 2024-02-09 ASSESSMENT — LOCATION SIMPLE DESCRIPTION DERM
LOCATION SIMPLE: UPPER BACK
LOCATION SIMPLE: RIGHT UPPER BACK
LOCATION SIMPLE: RIGHT CALF
LOCATION SIMPLE: LEFT PRETIBIAL REGION
LOCATION SIMPLE: ABDOMEN
LOCATION SIMPLE: RIGHT BREAST

## 2024-02-09 ASSESSMENT — LOCATION ZONE DERM
LOCATION ZONE: TRUNK
LOCATION ZONE: LEG

## 2024-03-06 ENCOUNTER — TELEPHONE (OUTPATIENT)
Dept: PRIMARY CARE CLINIC | Facility: CLINIC | Age: 64
End: 2024-03-06

## 2024-03-06 RX ORDER — LOSARTAN POTASSIUM 50 MG/1
50 TABLET ORAL
Qty: 30 TABLET | Refills: 0 | Status: CANCELLED
Start: 2024-03-06

## 2024-03-06 RX ORDER — TRAZODONE HYDROCHLORIDE 150 MG/1
150 TABLET ORAL
Refills: 0 | Status: CANCELLED
Start: 2024-03-06

## 2024-03-06 RX ORDER — TAMSULOSIN HYDROCHLORIDE 0.4 MG/1
0.4 CAPSULE ORAL DAILY
Qty: 30 CAPSULE | Refills: 0 | Status: CANCELLED
Start: 2024-03-06

## 2024-03-06 NOTE — TELEPHONE ENCOUNTER
Pt has been seen here as a new patient, but has never had his prescriptions refilled by our provider.

## 2024-03-07 RX ORDER — LOSARTAN POTASSIUM 50 MG/1
50 TABLET ORAL DAILY
Qty: 30 TABLET | Refills: 0 | Status: SHIPPED | OUTPATIENT
Start: 2024-03-07

## 2024-03-07 RX ORDER — TRAZODONE HYDROCHLORIDE 150 MG/1
150 TABLET ORAL NIGHTLY
Qty: 30 TABLET | Refills: 0 | Status: SHIPPED | OUTPATIENT
Start: 2024-03-07

## 2024-03-07 RX ORDER — TAMSULOSIN HYDROCHLORIDE 0.4 MG/1
0.4 CAPSULE ORAL DAILY
Qty: 30 CAPSULE | Refills: 0 | Status: SHIPPED | OUTPATIENT
Start: 2024-03-07

## 2024-04-23 ENCOUNTER — TELEPHONE (OUTPATIENT)
Dept: FAMILY MEDICINE CLINIC | Facility: CLINIC | Age: 64
End: 2024-04-23

## 2024-04-23 RX ORDER — ATORVASTATIN CALCIUM 40 MG/1
40 TABLET, FILM COATED ORAL DAILY
Qty: 90 TABLET | Refills: 1 | Status: SHIPPED | OUTPATIENT
Start: 2024-04-23

## 2024-04-23 RX ORDER — TAMSULOSIN HYDROCHLORIDE 0.4 MG/1
0.4 CAPSULE ORAL DAILY
Qty: 90 CAPSULE | Refills: 1 | Status: SHIPPED | OUTPATIENT
Start: 2024-04-23

## 2024-04-23 RX ORDER — LOSARTAN POTASSIUM 50 MG/1
50 TABLET ORAL DAILY
Qty: 90 TABLET | Refills: 1 | Status: SHIPPED | OUTPATIENT
Start: 2024-04-23

## 2024-04-23 RX ORDER — TRAZODONE HYDROCHLORIDE 150 MG/1
150 TABLET ORAL NIGHTLY
Qty: 90 TABLET | Refills: 1 | Status: SHIPPED | OUTPATIENT
Start: 2024-04-23

## 2024-04-23 NOTE — TELEPHONE ENCOUNTER
Tangela Neri, this pt some how got to [facility] instead of Dr. Bellamy's office.     He called in requesting a refill of Losartan, Tamsulosin, Trazodone and Atorvastatin from PCP. Are you able to forward this over to the correct MA so she can assist him please?

## 2024-08-21 ENCOUNTER — OFFICE VISIT (OUTPATIENT)
Dept: PRIMARY CARE CLINIC | Facility: CLINIC | Age: 64
End: 2024-08-21
Payer: COMMERCIAL

## 2024-08-21 VITALS
TEMPERATURE: 98.3 F | HEART RATE: 73 BPM | SYSTOLIC BLOOD PRESSURE: 140 MMHG | BODY MASS INDEX: 27.09 KG/M2 | DIASTOLIC BLOOD PRESSURE: 90 MMHG | OXYGEN SATURATION: 96 % | HEIGHT: 72 IN | WEIGHT: 200 LBS

## 2024-08-21 DIAGNOSIS — Z56.6 STRESS AT WORK: ICD-10-CM

## 2024-08-21 DIAGNOSIS — I10 HYPERTENSION, BENIGN: Primary | ICD-10-CM

## 2024-08-21 PROCEDURE — 3080F DIAST BP >= 90 MM HG: CPT | Performed by: FAMILY MEDICINE

## 2024-08-21 PROCEDURE — 99214 OFFICE O/P EST MOD 30 MIN: CPT | Performed by: FAMILY MEDICINE

## 2024-08-21 PROCEDURE — 3077F SYST BP >= 140 MM HG: CPT | Performed by: FAMILY MEDICINE

## 2024-08-21 RX ORDER — TRAZODONE HYDROCHLORIDE 150 MG/1
150 TABLET ORAL NIGHTLY
Qty: 90 TABLET | Refills: 1 | Status: SHIPPED | OUTPATIENT
Start: 2024-08-21

## 2024-08-21 RX ORDER — BUPROPION HYDROCHLORIDE 150 MG/1
150 TABLET ORAL EVERY MORNING
Qty: 90 TABLET | Refills: 1 | Status: SHIPPED | OUTPATIENT
Start: 2024-08-21

## 2024-08-21 SDOH — HEALTH STABILITY - MENTAL HEALTH: OTHER PHYSICAL AND MENTAL STRAIN RELATED TO WORK: Z56.6

## 2024-08-21 ASSESSMENT — ENCOUNTER SYMPTOMS
COUGH: 0
VOMITING: 0
SHORTNESS OF BREATH: 0
NAUSEA: 0
DIARRHEA: 0
ABDOMINAL PAIN: 0
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
## 2024-08-21 NOTE — PATIENT INSTRUCTIONS
IT WAS GREAT TO SEE YOU TODAY!    YOU ARE DUE FOR LAB WORK SO IF YOU STAY IN [place] WE NEED TO SCHEDULE LABS.  IF NOT YOU NEED TO HAVE THEM DONE WHEN YOU RETURN TO [place].    PLEASE TAKE ALL MEDICATION AS DISCUSSED.    ~STOP THE BUSPIRONE.    ~START THE BUPROPION DAILY TO HELP WITH YOUR MOOD.    PLEASE TRY TO KEEP AN EYE ON YOUR BLOOD PRESSURE EVERY WEEK OR TWO.    I WILL SEE YOU AGAIN IN 6 MONTHS BUT PLEASE CALL WITH CONCERNS [phone]

## 2024-08-21 NOTE — PROGRESS NOTES
[facility]  Mylene Bellamy, DO  [address] [facility], [place] [address] [phone]         ASSESSMENT AND PLAN    Problem List Items Addressed This Visit          Circulatory    Hypertension, benign - Primary      Chronic, elevated today, recheck with mild improvement but still elevated.  Patient has been under more stress about a possible move for work, will address stress levels with Wellbutrin.  Patient to return for nurse visit for blood pressure check in 1 month.  If still elevated will likely need to increase losartan.            Other    Stress at work      No change on buspirone, patient has done better on Wellbutrin in the past.  Has been feeling down more due to recent discussion about moving back to [place] for work.  Plan to stop buspirone and will start Wellbutrin.             The diagnoses and plan were discussed with the patient, who verbalizes understanding and agrees with plan.  All questions answered.    Chief Complaint    Chief Complaint   Patient presents with    6 Month Follow-Up       HISTORY OF PRESENT ILLNESS    64 y.o. male with HTN presents today for follow up.  Last seen 7 months ago to establish care, had recently moved to the area for work from [place], [place].  Was doing well on his losartan for blood pressure and Lipitor for cholesterol.  Was taking buspirone from his previous PCP due to stress at work but has not noticed much of a difference.  Doing well with sleep as long as he takes his trazodone.  Still taking Flomax due to an history of elevated PSA.  States that he previously took Wellbutrin and it helped a lot more with his mood.  Denies feeling irritable on this medicine.  States that he is under some pressure right now due to being told he may have to move back to [place], unsure when this will happen.  Family still lives in [place].  Feeling stressed out about this.    PAST MEDICAL HISTORY    Past Medical History:

## 2024-08-21 NOTE — ASSESSMENT & PLAN NOTE
No change on buspirone, patient has done better on Wellbutrin in the past.  Has been feeling down more due to recent discussion about moving back to [place] for work.  Plan to stop buspirone and will start Wellbutrin.

## 2024-09-06 DIAGNOSIS — Z00.00 WELL ADULT EXAM: Primary | ICD-10-CM

## 2024-09-06 DIAGNOSIS — Z12.5 SCREENING FOR MALIGNANT NEOPLASM OF PROSTATE: ICD-10-CM

## 2024-10-25 RX ORDER — BUPROPION HYDROCHLORIDE 150 MG/1
150 TABLET ORAL EVERY MORNING
Qty: 90 TABLET | Refills: 1 | Status: SHIPPED | OUTPATIENT
Start: 2024-10-25

## 2024-10-25 RX ORDER — LOSARTAN POTASSIUM 50 MG/1
50 TABLET ORAL DAILY
Qty: 90 TABLET | Refills: 1 | Status: SHIPPED | OUTPATIENT
Start: 2024-10-25

## 2024-10-25 RX ORDER — TAMSULOSIN HYDROCHLORIDE 0.4 MG/1
0.4 CAPSULE ORAL DAILY
Qty: 90 CAPSULE | Refills: 1 | Status: SHIPPED | OUTPATIENT
Start: 2024-10-25

## 2024-12-27 ENCOUNTER — TELEPHONE (OUTPATIENT)
Dept: PRIMARY CARE CLINIC | Facility: CLINIC | Age: 64
End: 2024-12-27
Payer: COMMERCIAL

## 2024-12-27 NOTE — TELEPHONE ENCOUNTER
No answer/left message on 12/27/24 at 8:24 am reminding patient about upcoming visit with labs needed prior to appointment. Lab order in chart

## 2025-01-03 ENCOUNTER — LAB VISIT (OUTPATIENT)
Dept: LAB | Facility: HOSPITAL | Age: 65
End: 2025-01-03
Attending: STUDENT IN AN ORGANIZED HEALTH CARE EDUCATION/TRAINING PROGRAM
Payer: COMMERCIAL

## 2025-01-03 DIAGNOSIS — Z00.00 WELL ADULT EXAM: ICD-10-CM

## 2025-01-03 DIAGNOSIS — Z12.5 SCREENING FOR MALIGNANT NEOPLASM OF PROSTATE: ICD-10-CM

## 2025-01-03 LAB
ALBUMIN SERPL-MCNC: 4.3 G/DL (ref 3.4–4.8)
ALBUMIN/GLOB SERPL: 1.8 RATIO (ref 1.1–2)
ALP SERPL-CCNC: 51 UNIT/L (ref 40–150)
ALT SERPL-CCNC: 30 UNIT/L (ref 0–55)
ANION GAP SERPL CALC-SCNC: 7 MEQ/L
AST SERPL-CCNC: 24 UNIT/L (ref 5–34)
BACTERIA #/AREA URNS AUTO: ABNORMAL /HPF
BASOPHILS # BLD AUTO: 0.04 X10(3)/MCL
BASOPHILS NFR BLD AUTO: 0.9 %
BILIRUB SERPL-MCNC: 0.5 MG/DL
BILIRUB UR QL STRIP.AUTO: NEGATIVE
BUN SERPL-MCNC: 13 MG/DL (ref 8.4–25.7)
CALCIUM SERPL-MCNC: 9.6 MG/DL (ref 8.8–10)
CHLORIDE SERPL-SCNC: 100 MMOL/L (ref 98–107)
CHOLEST SERPL-MCNC: 223 MG/DL
CHOLEST/HDLC SERPL: 3 {RATIO} (ref 0–5)
CLARITY UR: CLEAR
CO2 SERPL-SCNC: 29 MMOL/L (ref 23–31)
COLOR UR AUTO: YELLOW
CREAT SERPL-MCNC: 1.06 MG/DL (ref 0.72–1.25)
CREAT/UREA NIT SERPL: 12
EOSINOPHIL # BLD AUTO: 0.09 X10(3)/MCL (ref 0–0.9)
EOSINOPHIL NFR BLD AUTO: 2.1 %
ERYTHROCYTE [DISTWIDTH] IN BLOOD BY AUTOMATED COUNT: 12.7 % (ref 11.5–17)
EST. AVERAGE GLUCOSE BLD GHB EST-MCNC: 108.3 MG/DL
GFR SERPLBLD CREATININE-BSD FMLA CKD-EPI: >60 ML/MIN/1.73/M2
GLOBULIN SER-MCNC: 2.4 GM/DL (ref 2.4–3.5)
GLUCOSE SERPL-MCNC: 124 MG/DL (ref 82–115)
GLUCOSE UR QL STRIP: NORMAL
HBA1C MFR BLD: 5.4 %
HCT VFR BLD AUTO: 41.7 % (ref 42–52)
HDLC SERPL-MCNC: 64 MG/DL (ref 35–60)
HGB BLD-MCNC: 14.2 G/DL (ref 14–18)
HGB UR QL STRIP: NEGATIVE
IMM GRANULOCYTES # BLD AUTO: 0.01 X10(3)/MCL (ref 0–0.04)
IMM GRANULOCYTES NFR BLD AUTO: 0.2 %
KETONES UR QL STRIP: NEGATIVE
LDLC SERPL CALC-MCNC: 122 MG/DL (ref 50–140)
LEUKOCYTE ESTERASE UR QL STRIP: NEGATIVE
LYMPHOCYTES # BLD AUTO: 1.32 X10(3)/MCL (ref 0.6–4.6)
LYMPHOCYTES NFR BLD AUTO: 31 %
MCH RBC QN AUTO: 32.3 PG (ref 27–31)
MCHC RBC AUTO-ENTMCNC: 34.1 G/DL (ref 33–36)
MCV RBC AUTO: 95 FL (ref 80–94)
MONOCYTES # BLD AUTO: 0.28 X10(3)/MCL (ref 0.1–1.3)
MONOCYTES NFR BLD AUTO: 6.6 %
MUCOUS THREADS URNS QL MICRO: ABNORMAL /LPF
NEUTROPHILS # BLD AUTO: 2.52 X10(3)/MCL (ref 2.1–9.2)
NEUTROPHILS NFR BLD AUTO: 59.2 %
NITRITE UR QL STRIP: NEGATIVE
NRBC BLD AUTO-RTO: 0 %
PH UR STRIP: 7.5 [PH]
PLATELET # BLD AUTO: 191 X10(3)/MCL (ref 130–400)
PMV BLD AUTO: 9.3 FL (ref 7.4–10.4)
POTASSIUM SERPL-SCNC: 5.1 MMOL/L (ref 3.5–5.1)
PROT SERPL-MCNC: 6.7 GM/DL (ref 5.8–7.6)
PROT UR QL STRIP: NEGATIVE
PSA SERPL-MCNC: 6.91 NG/ML
RBC # BLD AUTO: 4.39 X10(6)/MCL (ref 4.7–6.1)
RBC #/AREA URNS AUTO: ABNORMAL /HPF
SODIUM SERPL-SCNC: 136 MMOL/L (ref 136–145)
SP GR UR STRIP.AUTO: 1.02 (ref 1–1.03)
SQUAMOUS #/AREA URNS LPF: ABNORMAL /HPF
TRIGL SERPL-MCNC: 185 MG/DL (ref 34–140)
TSH SERPL-ACNC: 2.26 UIU/ML (ref 0.35–4.94)
UROBILINOGEN UR STRIP-ACNC: NORMAL
VLDLC SERPL CALC-MCNC: 37 MG/DL
WBC # BLD AUTO: 4.26 X10(3)/MCL (ref 4.5–11.5)
WBC #/AREA URNS AUTO: ABNORMAL /HPF

## 2025-01-03 PROCEDURE — 84443 ASSAY THYROID STIM HORMONE: CPT

## 2025-01-03 PROCEDURE — 36415 COLL VENOUS BLD VENIPUNCTURE: CPT

## 2025-01-03 PROCEDURE — 80061 LIPID PANEL: CPT

## 2025-01-03 PROCEDURE — 83036 HEMOGLOBIN GLYCOSYLATED A1C: CPT

## 2025-01-03 PROCEDURE — 85025 COMPLETE CBC W/AUTO DIFF WBC: CPT

## 2025-01-03 PROCEDURE — 81015 MICROSCOPIC EXAM OF URINE: CPT

## 2025-01-03 PROCEDURE — 84153 ASSAY OF PSA TOTAL: CPT

## 2025-01-03 PROCEDURE — 80053 COMPREHEN METABOLIC PANEL: CPT

## 2025-01-10 ENCOUNTER — OFFICE VISIT (OUTPATIENT)
Dept: PRIMARY CARE CLINIC | Facility: CLINIC | Age: 65
End: 2025-01-10
Payer: COMMERCIAL

## 2025-01-10 VITALS
HEART RATE: 77 BPM | DIASTOLIC BLOOD PRESSURE: 79 MMHG | SYSTOLIC BLOOD PRESSURE: 119 MMHG | HEIGHT: 72 IN | WEIGHT: 190 LBS | BODY MASS INDEX: 25.73 KG/M2 | OXYGEN SATURATION: 97 % | TEMPERATURE: 98 F | RESPIRATION RATE: 16 BRPM

## 2025-01-10 DIAGNOSIS — Z23 NEED FOR DIPHTHERIA-TETANUS-PERTUSSIS (TDAP) VACCINE: ICD-10-CM

## 2025-01-10 DIAGNOSIS — Z00.00 WELLNESS EXAMINATION: Primary | ICD-10-CM

## 2025-01-10 DIAGNOSIS — R93.1 AGATSTON CORONARY ARTERY CALCIUM SCORE BETWEEN 200 AND 399: ICD-10-CM

## 2025-01-10 PROCEDURE — 90471 IMMUNIZATION ADMIN: CPT | Mod: ,,, | Performed by: STUDENT IN AN ORGANIZED HEALTH CARE EDUCATION/TRAINING PROGRAM

## 2025-01-10 PROCEDURE — 3074F SYST BP LT 130 MM HG: CPT | Mod: CPTII,,, | Performed by: STUDENT IN AN ORGANIZED HEALTH CARE EDUCATION/TRAINING PROGRAM

## 2025-01-10 PROCEDURE — 3078F DIAST BP <80 MM HG: CPT | Mod: CPTII,,, | Performed by: STUDENT IN AN ORGANIZED HEALTH CARE EDUCATION/TRAINING PROGRAM

## 2025-01-10 PROCEDURE — 3044F HG A1C LEVEL LT 7.0%: CPT | Mod: CPTII,,, | Performed by: STUDENT IN AN ORGANIZED HEALTH CARE EDUCATION/TRAINING PROGRAM

## 2025-01-10 PROCEDURE — 1160F RVW MEDS BY RX/DR IN RCRD: CPT | Mod: CPTII,,, | Performed by: STUDENT IN AN ORGANIZED HEALTH CARE EDUCATION/TRAINING PROGRAM

## 2025-01-10 PROCEDURE — 99396 PREV VISIT EST AGE 40-64: CPT | Mod: 25,,, | Performed by: STUDENT IN AN ORGANIZED HEALTH CARE EDUCATION/TRAINING PROGRAM

## 2025-01-10 PROCEDURE — 3008F BODY MASS INDEX DOCD: CPT | Mod: CPTII,,, | Performed by: STUDENT IN AN ORGANIZED HEALTH CARE EDUCATION/TRAINING PROGRAM

## 2025-01-10 PROCEDURE — 90715 TDAP VACCINE 7 YRS/> IM: CPT | Mod: ,,, | Performed by: STUDENT IN AN ORGANIZED HEALTH CARE EDUCATION/TRAINING PROGRAM

## 2025-01-10 PROCEDURE — 1159F MED LIST DOCD IN RCRD: CPT | Mod: CPTII,,, | Performed by: STUDENT IN AN ORGANIZED HEALTH CARE EDUCATION/TRAINING PROGRAM

## 2025-01-10 RX ORDER — BUPROPION HYDROCHLORIDE 150 MG/1
150 TABLET ORAL DAILY
COMMUNITY
Start: 2024-09-09

## 2025-01-10 NOTE — ASSESSMENT & PLAN NOTE
Reviewed recent wellness labs. See below for health maintenance    Vaccinations:   - Flu: declines   - Pneumonia: PCV13 2015. Recommended PCV20, pt wants to defer   - Shingles (>50): received in 2019   - Tdap: due (03/2015 last one). Tdap given 1/10/25   - COVID: did not receive  Screening:   - Prostate (>45): PSA elevated. S/p MRI and biopsy (normal). Follows Urology (Dr. Destin Cervantes)   - Lung Ca. Screening (50-80 with >20 pack yrs current or quit <15 yrs): n/a   - Colon Ca. Screening (>45): last c-scope 2019 w/Dr. Beauchamp. Repeat due 2029   - AAA (65-75 if ever smoked):    - Cardiac: Ca score moderate at 227. Discussed Cards referral, pt declines, wants to repeat Ca score which we can do at 3 years (2025).

## 2025-01-10 NOTE — PROGRESS NOTES
ANNUAL WELLNESS NOTE    Chief Complaint:  Wellness     HPI:  Vamsi Ratliff is a 64 y.o. male    -------------------------------------    Acute sinusitis    hx    Anxiety    Arthritis    Essential (primary) hypertension    Insomnia    Low testosterone in male    Male erectile dysfunction, unspecified    Mixed hyperlipidemia    ANA on CPAP       Patient Care Team:  Brian Amado MD as PCP - General (Internal Medicine)  Gregg Beauchamp MD as Consulting Physician (Gastroenterology)  Kumar Brower Jr., MD as Surgeon (General Surgery)  Destin Yun MD as Consulting Physician (Urology)    Presents today for wellness visit. Describes overall health as good. Diet is healthy overall. Exercises 4-5 times/wk. Tobacco use: chewing tobacco. Alcohol use: 8-12 drinks per weekend. Caffeine intake: 2-3 caffeinated drinks daily. Eye exam: annually (wears contacts). Dental exam: annually.    Review of Systems   Constitutional:  Negative for chills and fever.   HENT:  Negative for sinus pressure/congestion and sore throat.    Respiratory:  Negative for cough, shortness of breath and wheezing.    Cardiovascular:  Negative for chest pain and leg swelling.   Gastrointestinal:  Negative for abdominal pain, nausea and vomiting.   Genitourinary:  Negative for dysuria and hematuria.   Musculoskeletal:  Negative for arthralgias and myalgias.   Integumentary:  Negative for rash.   Neurological:  Negative for dizziness and syncope.   Hematological:  Negative for adenopathy.   Psychiatric/Behavioral:  Negative for confusion. The patient is not nervous/anxious.        Physical Exam  Vitals and nursing note reviewed.   Constitutional:       General: He is not in acute distress.  HENT:      Head: Normocephalic.      Nose: No rhinorrhea.   Eyes:      Conjunctiva/sclera: Conjunctivae normal.      Pupils: Pupils are equal, round, and reactive to light.   Cardiovascular:      Rate and Rhythm: Normal rate and regular rhythm.   Pulmonary:       Effort: Pulmonary effort is normal.      Breath sounds: Normal breath sounds.   Abdominal:      Palpations: Abdomen is soft.      Tenderness: There is no abdominal tenderness.   Musculoskeletal:         General: No deformity or signs of injury.   Skin:     General: Skin is warm and dry.   Neurological:      General: No focal deficit present.      Mental Status: He is alert. Mental status is at baseline.   Psychiatric:         Mood and Affect: Mood normal.         Behavior: Behavior normal.       Assessment/Plan:  1. Wellness examination  Assessment & Plan:  Reviewed recent wellness labs. See below for health maintenance    Vaccinations:   - Flu: declines   - Pneumonia: PCV13 2015. Recommended PCV20, pt wants to defer   - Shingles (>50): received in 2019   - Tdap: due (03/2015 last one). Tdap given 1/10/25   - COVID: did not receive  Screening:   - Prostate (>45): PSA elevated. S/p MRI and biopsy (normal). Follows Urology (Dr. Destin Cervantes)   - Lung Ca. Screening (50-80 with >20 pack yrs current or quit <15 yrs): n/a   - Colon Ca. Screening (>45): last c-scope 2019 w/Dr. Beauchamp. Repeat due 2029   - AAA (65-75 if ever smoked):    - Cardiac: Ca score moderate at 227. Discussed Cards referral, pt declines, wants to repeat Ca score which we can do at 3 years (2025).      2. Need for diphtheria-tetanus-pertussis (Tdap) vaccine  -     Tdap (BOOSTRIX) vaccine injection 0.5 mL    3. Agatston coronary artery calcium score between 200 and 399  -     CT Calcium Scoring Cardiac; Future; Expected date: 09/09/2025      Follow up in about 1 year (around 1/10/2026) for Wellness.

## 2025-01-27 RX ORDER — TAMSULOSIN HYDROCHLORIDE 0.4 MG/1
CAPSULE ORAL DAILY
Qty: 90 CAPSULE | Refills: 1 | OUTPATIENT
Start: 2025-01-27

## 2025-01-27 RX ORDER — LOSARTAN POTASSIUM 50 MG/1
50 TABLET ORAL DAILY
Qty: 90 TABLET | Refills: 1 | OUTPATIENT
Start: 2025-01-27

## 2025-05-05 RX ORDER — LOSARTAN POTASSIUM 50 MG/1
50 TABLET ORAL DAILY
Qty: 90 TABLET | Refills: 1 | OUTPATIENT
Start: 2025-05-05

## 2025-05-05 RX ORDER — TAMSULOSIN HYDROCHLORIDE 0.4 MG/1
CAPSULE ORAL DAILY
Qty: 90 CAPSULE | Refills: 1 | OUTPATIENT
Start: 2025-05-05

## 2025-05-05 RX ORDER — LOSARTAN POTASSIUM 50 MG/1
50 TABLET ORAL DAILY
Qty: 90 TABLET | Refills: 1 | Status: SHIPPED | OUTPATIENT
Start: 2025-05-05

## 2025-05-05 RX ORDER — TAMSULOSIN HYDROCHLORIDE 0.4 MG/1
CAPSULE ORAL DAILY
Qty: 90 CAPSULE | Refills: 1 | Status: SHIPPED | OUTPATIENT
Start: 2025-05-05

## 2025-07-01 ENCOUNTER — COMMUNITY OUTREACH (OUTPATIENT)
Dept: PRIMARY CARE CLINIC | Facility: CLINIC | Age: 65
End: 2025-07-01

## 2025-07-01 NOTE — PROGRESS NOTES
Patient's HM shows they are overdue for Colorectal Screening.   Care Everywhere and  files searched.  No results to attach to order nor HM updated.      Records requested Dr. Judson Marie Fax: [phone]

## 2025-07-30 ENCOUNTER — TELEPHONE (OUTPATIENT)
Dept: PRIMARY CARE CLINIC | Facility: CLINIC | Age: 65
End: 2025-07-30
Payer: COMMERCIAL

## 2025-07-30 DIAGNOSIS — F51.05 INSOMNIA SECONDARY TO ANXIETY: ICD-10-CM

## 2025-07-30 DIAGNOSIS — F41.9 ANXIETY DISORDER, UNSPECIFIED TYPE: Primary | ICD-10-CM

## 2025-07-30 DIAGNOSIS — F41.9 INSOMNIA SECONDARY TO ANXIETY: ICD-10-CM

## 2025-07-30 DIAGNOSIS — E78.00 HYPERCHOLESTEROLEMIA WITH LDL GREATER THAN 190 MG/DL: ICD-10-CM

## 2025-07-30 DIAGNOSIS — I10 ESSENTIAL HYPERTENSION: ICD-10-CM

## 2025-07-30 RX ORDER — ATORVASTATIN CALCIUM 40 MG/1
40 TABLET, FILM COATED ORAL NIGHTLY
Qty: 90 TABLET | Refills: 3 | Status: SHIPPED | OUTPATIENT
Start: 2025-07-30

## 2025-07-30 RX ORDER — BUPROPION HYDROCHLORIDE 150 MG/1
150 TABLET ORAL DAILY
Qty: 90 TABLET | Refills: 3 | Status: SHIPPED | OUTPATIENT
Start: 2025-07-30

## 2025-07-30 RX ORDER — LOSARTAN POTASSIUM 50 MG/1
50 TABLET ORAL DAILY
Qty: 90 TABLET | Refills: 3 | Status: SHIPPED | OUTPATIENT
Start: 2025-07-30

## 2025-07-30 RX ORDER — TRAZODONE HYDROCHLORIDE 150 MG/1
TABLET ORAL
Qty: 90 TABLET | Refills: 3 | Status: SHIPPED | OUTPATIENT
Start: 2025-07-30

## 2025-07-30 NOTE — TELEPHONE ENCOUNTER
Copied from CRM #[id number]. Topic: Medications - Medication Refill  >> Jul 29, 2025  4:44 PM Brenda wrote:  .Who Called: Vamsi Ratliff    Refill or New Rx:Refill    RX Name and Strength: buPROPion (WELLBUTRIN XL) 150 MG TB24 tablet    How is the patient currently taking it? (ex. 1XDay): Sig - Route: Take 150 mg by mouth once daily. - Oral    Is this a 30 day or 90 day RX: N/A    Local or Mail Order: Local    List of preferred pharmacies on file (remove unneeded): [facility]/pharmacy [address] [place] [address].   Phone: [phone]  Fax: [phone]    Ordering Provider: N/A    Preferred Method of Contact: Phone Call    Patient's Preferred Phone Number on File: [phone]     Best Call Back Number, if different:    Additional Information: N/A               Refill or New Rx:Refill    RX Name and Strength: atorvastatin (LIPITOR) 40 MG tablet    How is the patient currently taking it? (ex. 1XDay): Sig - Route: Take 1 tablet (40 mg total) by mouth every evening. - Oral    Is this a 30 day or 90 day RX: 90    Local or Mail Order: Local    List of preferred pharmacies on file (remove unneeded): [facility]/pharmacy [address] [place] [address].   Phone: [phone]  Fax: [phone]    Ordering Provider: Dr. Amado    Preferred Method of Contact: Phone Call    Patient's Preferred Phone Number on File: [phone]     Best Call Back Number, if different:    Additional Information: N/A               Refill or New Rx:Refill    RX Name and Strength: losartan (COZAAR) 50 MG tablet    How is the patient currently taking it? (ex. 1XDay): Sig - Route: Take 1 tablet (50 mg total) by mouth once daily. - Oral    Is this a 30 day or 90 day RX: 90    Local or Mail Order: Local    List of preferred pharmacies on file (remove unneeded): [facility]/pharmacy [address] [place] [address].   Phone: [phone]  Fax: [phone]    Ordering Provider: Dr. Amado    Preferred Method of Contact: Phone  Call    Patient's Preferred Phone Number on File: [phone]     Best Call Back Number, if different:    Additional Information: N/A                 Refill or New Rx:Refill    RX Name and Strength: traZODone (DESYREL) 150 MG tablett    How is the patient currently taking it? (ex. 1XDay): Sig: TAKE 1 TABLET(150 MG) BY MOUTH EVERY NIGHT 1 HOUR BEFORE BEDTIME AS NEEDED FOR INSOMNIA    Is this a 30 day or 90 day RX: 90    Local or Mail Order: Local    List of preferred pharmacies on file (remove unneeded): [facility]/pharmacy #[id number] - [place], [place] - [address]   Phone: [phone]  Fax: [phone]    Ordering Provider: Dr. Amado    Preferred Method of Contact: Phone Call    Patient's Preferred Phone Number on File: [phone]     Best Call Back Number, if different:    Additional Information: N/A

## 2025-08-01 RX ORDER — TRAZODONE HYDROCHLORIDE 150 MG/1
150 TABLET ORAL NIGHTLY
Qty: 90 TABLET | Refills: 1 | Status: SHIPPED | OUTPATIENT
Start: 2025-08-01